# Patient Record
Sex: FEMALE | Race: WHITE | NOT HISPANIC OR LATINO | URBAN - NONMETROPOLITAN AREA
[De-identification: names, ages, dates, MRNs, and addresses within clinical notes are randomized per-mention and may not be internally consistent; named-entity substitution may affect disease eponyms.]

---

## 2017-03-15 ENCOUNTER — OFFICE VISIT (OUTPATIENT)
Dept: URGENT CARE | Facility: PHYSICIAN GROUP | Age: 47
End: 2017-03-15
Payer: MEDICAID

## 2017-03-15 VITALS
WEIGHT: 190 LBS | HEART RATE: 63 BPM | RESPIRATION RATE: 18 BRPM | TEMPERATURE: 98.1 F | OXYGEN SATURATION: 97 % | DIASTOLIC BLOOD PRESSURE: 62 MMHG | SYSTOLIC BLOOD PRESSURE: 122 MMHG

## 2017-03-15 DIAGNOSIS — L71.9 ROSACEA: ICD-10-CM

## 2017-03-15 DIAGNOSIS — L29.9 PRURITUS: ICD-10-CM

## 2017-03-15 PROCEDURE — 99214 OFFICE O/P EST MOD 30 MIN: CPT | Performed by: PHYSICIAN ASSISTANT

## 2017-03-15 RX ORDER — HYDROXYZINE HYDROCHLORIDE 25 MG/1
25 TABLET, FILM COATED ORAL 3 TIMES DAILY PRN
Qty: 30 TAB | Refills: 0 | Status: SHIPPED | OUTPATIENT
Start: 2017-03-15 | End: 2024-02-22

## 2017-03-15 ASSESSMENT — ENCOUNTER SYMPTOMS
FEVER: 0
MYALGIAS: 0
CHILLS: 0
FATIGUE: 0

## 2017-03-15 NOTE — PROGRESS NOTES
Subjective:      Roxann Houston is a 46 y.o. female who presents with Rash            HPI Comments: Patient reports that she's had an erythematous, pruritic rash of her face on and off for the last several months. She was told that it was likely rosacea and was given metronidazole cream. She was told to use the cream once a day when symptoms flared up. She reports improvement with treatment, but not resolution. Over the last week or so she has noticed significant pruritus.    Rash  This is a recurrent problem. The current episode started more than 1 month ago. The problem has been waxing and waning since onset. The affected locations include the face. The rash is characterized by redness and swelling. She was exposed to nothing. Pertinent negatives include no fatigue or fever. Treatments tried: topical antifungal, topical steroids. The treatment provided moderate relief.       Review of Systems   Constitutional: Negative for fever, chills and fatigue.   Musculoskeletal: Negative for myalgias.   Skin: Positive for itching and rash.       Allergies:Aspirin; Latex; Penicillins; and Sulfa drugs    Current Outpatient Prescriptions Ordered in [facility]   Medication Sig Dispense Refill   • metronidazole (NORITATE) 1 % cream Apply 1 Application to affected area(s) 2 times a day. 60 g 0   • hydrOXYzine (ATARAX) 25 MG Tab Take 1 Tab by mouth 3 times a day as needed for Itching. 30 Tab 0   • lisinopril (PRINIVIL) 10 MG Tab Take 10 mg by mouth 2 times a day.     • gabapentin (NEURONTIN) 600 MG tablet Take 600 mg by mouth 3 times a day.     • Cyanocobalamin (VITAMIN B 12 PO) Take  by mouth.     • Multiple Vitamin (VITAMIN E/FOLIC ACID/B-6/B-12 PO) Take  by mouth.     • phentermine (ADIPEX-P) 37.5 MG tablet Take 37.5 mg by mouth every morning before breakfast.     • levothyroxine (SYNTHROID) 100 MCG Tab Take 100 mcg by mouth Every morning on an empty stomach.     • Interferon Beta-1a (AVONEX IM) by Intramuscular route.     • ibuprofen  (MOTRIN) 800 MG Tab Take 800 mg by mouth every 8 hours as needed.     • Omega-3 Fatty Acids (FISH OIL PO) Take  by mouth.       No current Epic-ordered facility-administered medications on file.       History reviewed. No pertinent past medical history.    Social History   Substance Use Topics   • Smoking status: Never Smoker    • Smokeless tobacco: Never Used   • Alcohol Use: Yes      Comment: occ       No family status information on file.   History reviewed. No pertinent family history.     Objective:     /62 mmHg  Pulse 63  Temp(Src) 36.7 °C (98.1 °F)  Resp 18  Wt 86.183 kg (190 lb)  SpO2 97%  Breastfeeding? No     Physical Exam   Constitutional: She is oriented to person, place, and time. She appears well-developed and well-nourished. No distress.   HENT:   Head: Normocephalic and atraumatic.   Slight erythema with a few small raised lesions of the face   Eyes: Right eye exhibits no discharge. Left eye exhibits no discharge.   Neck: Normal range of motion. Neck supple.   Cardiovascular: Normal rate.    Pulmonary/Chest: Effort normal.   Neurological: She is alert and oriented to person, place, and time.   Skin: Skin is warm and dry. She is not diaphoretic.   Psychiatric: She has a normal mood and affect. Her behavior is normal. Judgment and thought content normal.   Nursing note and vitals reviewed.              Assessment/Plan:     1. Rosacea  metronidazole (NORITATE) 1 % cream    Improvement with metronidazole cream in the past. Refill prescription. Follow-up with PCP. Return if worsening.   2. Pruritus  hydrOXYzine (ATARAX) 25 MG Tab    Fluctuating, quite severe at times. Start hydroxyzine. Follow-up with PCP. Return if worsening.       [employer] Interactive Patient Education given: Rosacea    Please note that this dictation was created using voice recognition software. I have made every reasonable attempt to correct obvious errors, but I expect that there are errors of grammar and possibly content  that I did not discover before finalizing the note.

## 2017-03-15 NOTE — PATIENT INSTRUCTIONS
Rosacea  Rosacea is a long-term (chronic) condition that affects the skin of the face (cheeks, nose, brow, and chin) and sometimes the eyes. Rosacea causes the blood vessels near the surface of the skin to enlarge, resulting in redness. This condition usually begins after age 30. It occurs most often in light-skinned women. Without treatment, rosacea tends to get worse over time. There is no cure for rosacea, but treatment can help control your symptoms.  CAUSES   The cause is unknown. It is thought that some people may inherit a tendency to develop rosacea. Certain triggers can make your rosacea worse, including:  · Hot baths.  · Exercise.  · Sunlight.  · Very hot or cold temperatures.  · Hot or spicy foods and drinks.  · Drinking alcohol.  · Stress.  · Taking blood pressure medicine.  · Long-term use of topical steroids on the face.  SYMPTOMS   · Redness of the face.  · Red bumps or pimples on the face.  · Red, enlarged nose (rhinophyma).  · Blushing easily.  · Red lines on the skin.  · Irritated or burning feeling in the eyes.  · Swollen eyelids.  DIAGNOSIS   Your caregiver can usually tell what is wrong by asking about your symptoms and performing a physical exam.  TREATMENT   Avoiding triggers is an important part of treatment. You will also need to see a skin specialist (dermatologist) who can develop a treatment plan for you. The goals of treatment are to control your condition and to improve the appearance of your skin. It may take several weeks or months of treatment before you notice an improvement in your skin. Even after your skin improves, you will likely need to continue treatment to prevent your rosacea from coming back. Treatment methods may include:  · Using sunscreen or sunblock daily to protect the skin.  · Antibiotic medicine, such as metronidazole, applied directly to the skin.  · Antibiotics taken by mouth. This is usually prescribed if you have eye problems from your rosacea.  · Laser surgery  to improve the appearance of the skin. This surgery can reduce the appearance of red lines on the skin and can remove excess tissue from the nose to reduce its size.  HOME CARE INSTRUCTIONS  · Avoid things that seem to trigger your flare-ups.  · If you are given antibiotics, take them as directed. Finish them even if you start to feel better.  · Use a gentle facial cleanser that does not contain alcohol.  · You may use a mild facial moisturizer.  · Use a sunscreen or sunblock with SPF 30 or greater.  · Wear a green-tinted foundation powder to conceal redness, if needed. Choose cosmetics that are noncomedogenic. This means they do not block your pores.  · If your eyelids are affected, apply warm compresses to the eyelids. Do this up to 4 times a day or as directed by your caregiver.  SEEK MEDICAL CARE IF:  · Your skin problems get worse.  · You feel depressed.  · You lose your appetite.  · You have trouble concentrating.  · You have problems with your eyes, such as redness or itching.  MAKE SURE YOU:  · Understand these instructions.  · Will watch your condition.  · Will get help right away if you are not doing well or get worse.     This information is not intended to replace advice given to you by your health care provider. Make sure you discuss any questions you have with your health care provider.     Document Released: 01/25/2006 Document Revised: 01/08/2016 Document Reviewed: 02/24/2016  [employer] Interactive Patient Education ©2016 [employer]

## 2017-03-15 NOTE — MR AVS SNAPSHOT
Roxann Houston   3/15/2017 3:25 PM   Office Visit   MRN: [id number]    Department:  [facility]   Dept Phone:  [phone]    Description:  Female : 1970   Provider:  AIYANA Garcia           Reason for Visit     Rash Pt states on and off since July, redness on face, itching, pain      Allergies as of 3/15/2017     Allergen Noted Reactions    Aspirin 2016       sensitive    Latex 2016       Penicillins 2016       Sulfa Drugs 2016         You were diagnosed with     Rosacea   [695.3.ICD-9-CM]       Pruritus   [329500]         Vital Signs     Blood Pressure Pulse Temperature Respirations Weight Oxygen Saturation    122/62 mmHg 63 36.7 °C (98.1 °F) 18 86.183 kg (190 lb) 97%    Breastfeeding? Smoking Status                No Never Smoker           Basic Information     Date Of Birth Sex Race Ethnicity Preferred Language    1970 Female White Non- English      Health Maintenance        Date Due Completion Dates    IMM DTaP/Tdap/Td Vaccine (1 - Tdap) 1988 ---    PAP SMEAR 1990 ---    MAMMOGRAM 2009 ---    IMM INFLUENZA (1) 2016 ---            Current Immunizations     No immunizations on file.      Below and/or attached are the medications your provider expects you to take. Review all of your home medications and newly ordered medications with your provider and/or pharmacist. Follow medication instructions as directed by your provider and/or pharmacist. Please keep your medication list with you and share with your provider. Update the information when medications are discontinued, doses are changed, or new medications (including over-the-counter products) are added; and carry medication information at all times in the event of emergency situations     Allergies:  ASPIRIN - (reactions not documented)     LATEX - (reactions not documented)     PENICILLINS - (reactions not documented)     SULFA DRUGS - (reactions not documented)                  Medications  Valid as of: March 15, 2017 -  4:12 PM    Generic Name Brand Name Tablet Size Instructions for use    Cyanocobalamin   Take  by mouth.        Gabapentin (Tab) NEURONTIN 600 MG Take 600 mg by mouth 3 times a day.        HydrOXYzine HCl (Tab) ATARAX 25 MG Take 1 Tab by mouth 3 times a day as needed for Itching.        Ibuprofen (Tab) MOTRIN 800 MG Take 800 mg by mouth every 8 hours as needed.        Interferon Beta-1a   by Intramuscular route.        Levothyroxine Sodium (Tab) SYNTHROID 100 MCG Take 100 mcg by mouth Every morning on an empty stomach.        Lisinopril (Tab) PRINIVIL 10 MG Take 10 mg by mouth 2 times a day.        MetroNIDAZOLE (Cream) NORITATE 1 % Apply 1 Application to affected area(s) 2 times a day.        Multiple Vitamin   Take  by mouth.        Omega-3 Fatty Acids   Take  by mouth.        Phentermine HCl (Tab) ADIPEX-P 37.5 MG Take 37.5 mg by mouth every morning before breakfast.        .                 Medicines prescribed today were sent to:     [address] [address]    Phone: [phone] Fax: [phone]    Open 24 Hours?: No      Medication refill instructions:       If your prescription bottle indicates you have medication refills left, it is not necessary to call your provider’s office. Please contact your pharmacy and they will refill your medication.    If your prescription bottle indicates you do not have any refills left, you may request refills at any time through one of the following ways: The online Mi-Pay system (except Urgent Care), by calling your provider’s office, or by asking your pharmacy to contact your provider’s office with a refill request. Medication refills are processed only during regular business hours and may not be available until the next business day. Your provider may request additional information or to have a follow-up visit with you prior to refilling your medication.    *Please Note: Medication refills are assigned a new Rx number when refilled electronically. Your pharmacy may indicate that no refills were authorized even though a new prescription for the same medication is available at the pharmacy. Please request the medicine by name with the pharmacy before contacting your provider for a refill.        Instructions    Rosacea  Rosacea is a long-term (chronic) condition that affects the skin of the face (cheeks, nose, brow, and chin) and sometimes the eyes. Rosacea causes the blood vessels near the surface of the skin to enlarge, resulting in redness. This condition usually begins after age 30. It occurs most often in light-skinned women. Without treatment, rosacea tends to get worse over time. There is no cure for rosacea, but treatment can help control your symptoms.  CAUSES   The cause is unknown. It is thought that some people may inherit a tendency to develop rosacea. Certain triggers can make your rosacea worse, including:  · Hot baths.  · Exercise.  · Sunlight.  · Very hot or cold temperatures.  · Hot or spicy foods and drinks.  · Drinking alcohol.  · Stress.  · Taking blood pressure medicine.  · Long-term use of topical steroids on the face.  SYMPTOMS   · Redness of the face.  · Red bumps or pimples on the face.  · Red, enlarged nose (rhinophyma).  · Blushing easily.  · Red lines on the skin.  · Irritated or burning feeling in the eyes.  · Swollen eyelids.  DIAGNOSIS   Your caregiver can usually tell what is wrong by asking about your symptoms and performing a physical exam.  TREATMENT   Avoiding triggers is an important part of treatment. You will also need to see a skin specialist (dermatologist) who can develop a treatment plan for you. The goals of treatment are to control your condition and to improve the appearance of your skin. It may take several weeks or months of treatment before you notice an improvement in your skin. Even after your skin improves, you will  likely need to continue treatment to prevent your rosacea from coming back. Treatment methods may include:  · Using sunscreen or sunblock daily to protect the skin.  · Antibiotic medicine, such as metronidazole, applied directly to the skin.  · Antibiotics taken by mouth. This is usually prescribed if you have eye problems from your rosacea.  · Laser surgery to improve the appearance of the skin. This surgery can reduce the appearance of red lines on the skin and can remove excess tissue from the nose to reduce its size.  HOME CARE INSTRUCTIONS  · Avoid things that seem to trigger your flare-ups.  · If you are given antibiotics, take them as directed. Finish them even if you start to feel better.  · Use a gentle facial cleanser that does not contain alcohol.  · You may use a mild facial moisturizer.  · Use a sunscreen or sunblock with SPF 30 or greater.  · Wear a green-tinted foundation powder to conceal redness, if needed. Choose cosmetics that are noncomedogenic. This means they do not block your pores.  · If your eyelids are affected, apply warm compresses to the eyelids. Do this up to 4 times a day or as directed by your caregiver.  SEEK MEDICAL CARE IF:  · Your skin problems get worse.  · You feel depressed.  · You lose your appetite.  · You have trouble concentrating.  · You have problems with your eyes, such as redness or itching.  MAKE SURE YOU:  · Understand these instructions.  · Will watch your condition.  · Will get help right away if you are not doing well or get worse.     This information is not intended to replace advice given to you by your health care provider. Make sure you discuss any questions you have with your health care provider.     Document Released: 01/25/2006 Document Revised: 01/08/2016 Document Reviewed: 02/24/2016  [employer] Interactive Patient Education ©2016 [employer].            Soil IQ Access Code: [email or web address]  Expires: 4/14/2017  4:12 PM    [employer] IQ  A secure, online tool  to manage your health information     [employer]’s Badge® is a secure, online tool that connects you to your personalized health information from the privacy of your home -- day or night - making it very easy for you to manage your healthcare. Once the activation process is completed, you can even access your medical information using the Badge tigre, which is available for free in the Apple Tigre store or Google Play store.     Badge provides the following levels of access (as shown below):   My Chart Features   [facility] Primary [facility] Doctor [facility]  Specialists [facility]  Urgent  Care [facility]  Primary Care  Doctor   Email your healthcare team securely and privately 24/7 X X X    Manage appointments: schedule your next appointment; view details of past/upcoming appointments X      Request prescription refills. X      View recent personal medical records, including lab and immunizations X X X X   View health record, including health history, allergies, medications X X X X   Read reports about your outpatient visits, procedures, consult and ER notes X X X X   See your discharge summary, which is a recap of your hospital and/or ER visit that includes your diagnosis, lab results, and care plan. X X       How to register for Badge:  1. Go to  [email or web address].  2. Click on the Sign Up Now box, which takes you to the New Member Sign Up page. You will need to provide the following information:  a. Enter your Badge Access Code exactly as it appears at the top of this page. (You will not need to use this code after you’ve completed the sign-up process. If you do not sign up before the expiration date, you must request a new code.)   b. Enter your date of birth.   c. Enter your home email address.   d. Click Submit, and follow the next screen’s instructions.  3. Create a Badge ID. This will be your Badge login ID and cannot be changed, so think of one that is secure and easy to remember.  4. Create a  AFTER-MOUSE password. You can change your password at any time.  5. Enter your Password Reset Question and Answer. This can be used at a later time if you forget your password.   6. Enter your e-mail address. This allows you to receive e-mail notifications when new information is available in AFTER-MOUSE.  7. Click Sign Up. You can now view your health information.    For assistance activating your AFTER-MOUSE account, call [phone]

## 2024-02-22 ENCOUNTER — OFFICE VISIT (OUTPATIENT)
Dept: URGENT CARE | Facility: PHYSICIAN GROUP | Age: 54
End: 2024-02-22
Payer: MEDICAID

## 2024-02-22 VITALS
DIASTOLIC BLOOD PRESSURE: 70 MMHG | SYSTOLIC BLOOD PRESSURE: 110 MMHG | HEART RATE: 82 BPM | OXYGEN SATURATION: 100 % | WEIGHT: 200 LBS | RESPIRATION RATE: 18 BRPM | TEMPERATURE: 97.3 F

## 2024-02-22 DIAGNOSIS — R06.2 WHEEZING: ICD-10-CM

## 2024-02-22 DIAGNOSIS — R05.1 ACUTE COUGH: ICD-10-CM

## 2024-02-22 PROBLEM — M79.2 NEURALGIA: Status: ACTIVE | Noted: 2022-02-28

## 2024-02-22 PROBLEM — G57.11 LATERAL FEMORAL CUTANEOUS NEUROPATHY, RIGHT: Status: ACTIVE | Noted: 2022-02-28

## 2024-02-22 PROBLEM — N81.4 UTERINE PROLAPSE: Status: ACTIVE | Noted: 2024-02-22

## 2024-02-22 PROBLEM — C56.9: Status: ACTIVE | Noted: 2024-02-22

## 2024-02-22 LAB
FLUAV RNA SPEC QL NAA+PROBE: NEGATIVE
FLUBV RNA SPEC QL NAA+PROBE: NEGATIVE
RSV RNA SPEC QL NAA+PROBE: POSITIVE
SARS-COV-2 RNA RESP QL NAA+PROBE: NEGATIVE

## 2024-02-22 PROCEDURE — 99203 OFFICE O/P NEW LOW 30 MIN: CPT | Performed by: NURSE PRACTITIONER

## 2024-02-22 PROCEDURE — 3078F DIAST BP <80 MM HG: CPT | Performed by: NURSE PRACTITIONER

## 2024-02-22 PROCEDURE — 87637 SARSCOV2&INF A&B&RSV AMP PRB: CPT | Mod: QW | Performed by: NURSE PRACTITIONER

## 2024-02-22 PROCEDURE — 3074F SYST BP LT 130 MM HG: CPT | Performed by: NURSE PRACTITIONER

## 2024-02-22 RX ORDER — BACLOFEN 10 MG/1
10 TABLET ORAL 2 TIMES DAILY
COMMUNITY
Start: 2024-02-03

## 2024-02-22 RX ORDER — BENZONATATE 100 MG/1
100 CAPSULE ORAL 3 TIMES DAILY PRN
Qty: 30 CAPSULE | Refills: 0 | Status: SHIPPED | OUTPATIENT
Start: 2024-02-22 | End: 2024-03-03

## 2024-02-22 RX ORDER — DULOXETIN HYDROCHLORIDE 60 MG/1
60 CAPSULE, DELAYED RELEASE ORAL DAILY
COMMUNITY
Start: 2024-02-15

## 2024-02-22 RX ORDER — ALBUTEROL SULFATE 90 UG/1
2 AEROSOL, METERED RESPIRATORY (INHALATION) EVERY 6 HOURS PRN
Qty: 8.5 G | Refills: 0 | Status: SHIPPED | OUTPATIENT
Start: 2024-02-22

## 2024-02-22 NOTE — PROGRESS NOTES
Subjective:   Roxann Houston is a 53 y.o. female who presents for Cough (Coughing, feeling it in chest. Exposed to rsv by kids. Does have MS. Started cough on Saturday. No other symptoms. Yellow mucous from nose. )    Patient is a 53-year-old female who presents clinic today reporting 5-day history cough with intermittent clear sputum production, mild wheezing, and fatigue.  She has not had any fevers, chills, chest pain, palpitations, sore throat, nausea, vomiting, or diarrhea.  She has been taking over-the-counter Mucinex which has helped some with her symptoms.  She states that overall she feels fine however has a cough with wheezing.  She was exposed to RSV.    Medications, Allergies, and current problem list reviewed today in Epic.     Objective:     /70   Pulse 82   Temp 36.3 °C (97.3 °F) (Temporal)   Resp 18   Wt 90.7 kg (200 lb)   SpO2 100%     Physical Exam  Vitals reviewed.   Constitutional:       General: She is not in acute distress.     Appearance: Normal appearance. She is not ill-appearing or toxic-appearing.   HENT:      Head: Normocephalic.      Right Ear: Tympanic membrane, ear canal and external ear normal.      Left Ear: Tympanic membrane, ear canal and external ear normal.      Nose: Nose normal. No rhinorrhea.      Mouth/Throat:      Mouth: Mucous membranes are moist.      Pharynx: No posterior oropharyngeal erythema.   Eyes:      Extraocular Movements: Extraocular movements intact.      Conjunctiva/sclera: Conjunctivae normal.      Pupils: Pupils are equal, round, and reactive to light.   Cardiovascular:      Rate and Rhythm: Normal rate and regular rhythm.   Pulmonary:      Effort: Pulmonary effort is normal. No respiratory distress.      Breath sounds: No stridor. No wheezing, rhonchi or rales.   Musculoskeletal:         General: Normal range of motion.      Cervical back: Normal range of motion and neck supple.   Skin:     General: Skin is warm and dry.   Neurological:       Mental Status: She is alert and oriented to person, place, and time.   Psychiatric:         Mood and Affect: Mood normal.         Behavior: Behavior normal.         Thought Content: Thought content normal.         Judgment: Judgment normal.       Results for orders placed or performed in visit on 02/22/24   POCT CoV-2, Flu A/B, RSV by PCR   Result Value Ref Range    SARS-CoV-2 by PCR Negative Negative, Invalid    Influenza virus A RNA Negative Negative, Invalid    Influenza virus B, PCR Negative Negative, Invalid    RSV, PCR Positive (A) Negative, Invalid       Assessment/Plan:     Diagnosis and associated orders:     1. Acute cough  POCT CoV-2, Flu A/B, RSV by PCR    albuterol 108 (90 Base) MCG/ACT Aero Soln inhalation aerosol    benzonatate (TESSALON) 100 MG Cap      2. Wheezing  POCT CoV-2, Flu A/B, RSV by PCR    albuterol 108 (90 Base) MCG/ACT Aero Soln inhalation aerosol    benzonatate (TESSALON) 100 MG Cap         Comments/MDM:     Discussed the management of RSV  and expected course is outined. Reviewed the need for frequent  nasal toileting and use of nasal saline to ensure movement of mucus and prevention of respiratory distress and pneumonia.  Recommend having bed side humidification and elevation of HOB.  Increase fluids, recommend adequate rest.  Treat fevers and bodyaches with Tylenol Motrin per  directions.  Please follow-up in clinic if fever persists or  reoccurs, no improvement with cough or you are not feeling adequately hydrated.    Patient was involved with shared decision-making throughout the exam today and verbalizes understanding regards to plan of care, discharge instructions, and follow-up         Differential diagnosis, natural history, supportive care, and indications for immediate follow-up discussed.    Advised the patient to follow-up with the primary care physician for recheck, reevaluation, and consideration of further management.    I personally reviewed prior external  notes and test results pertinent to today's visit as well as additional imaging and testing completed in clinic today.     Please note that this dictation was created using voice recognition software. I have made a reasonable attempt to correct obvious errors, but I expect that there are errors of grammar and possibly content that I did not discover before finalizing the note.

## 2024-05-20 ENCOUNTER — APPOINTMENT (OUTPATIENT)
Dept: GYNECOLOGY | Facility: CLINIC | Age: 54
End: 2024-05-20
Payer: MEDICAID

## 2024-05-20 VITALS
HEART RATE: 66 BPM | BODY MASS INDEX: 33.12 KG/M2 | DIASTOLIC BLOOD PRESSURE: 90 MMHG | HEIGHT: 67 IN | WEIGHT: 211 LBS | SYSTOLIC BLOOD PRESSURE: 133 MMHG

## 2024-05-20 DIAGNOSIS — G35 MULTIPLE SCLEROSIS (HCC): ICD-10-CM

## 2024-05-20 DIAGNOSIS — N81.9 VAGINAL VAULT PROLAPSE: ICD-10-CM

## 2024-05-20 DIAGNOSIS — N95.8 GENITOURINARY SYNDROME OF MENOPAUSE: ICD-10-CM

## 2024-05-20 DIAGNOSIS — N81.11 CYSTOCELE, MIDLINE: ICD-10-CM

## 2024-05-20 DIAGNOSIS — N39.3 STRESS INCONTINENCE: Primary | ICD-10-CM

## 2024-05-20 DIAGNOSIS — N81.6 RECTOCELE: ICD-10-CM

## 2024-05-20 DIAGNOSIS — R39.198 VOIDING DIFFICULTY: ICD-10-CM

## 2024-05-20 LAB
APPEARANCE UR: CLEAR
BILIRUB UR STRIP-MCNC: NEGATIVE MG/DL
COLOR UR AUTO: YELLOW
GLUCOSE UR STRIP.AUTO-MCNC: NEGATIVE MG/DL
KETONES UR STRIP.AUTO-MCNC: NEGATIVE MG/DL
LEUKOCYTE ESTERASE UR QL STRIP.AUTO: NEGATIVE
NITRITE UR QL STRIP.AUTO: NEGATIVE
PH UR STRIP.AUTO: 7 [PH] (ref 5–8)
PROT UR QL STRIP: NEGATIVE MG/DL
RBC UR QL AUTO: NEGATIVE
SP GR UR STRIP.AUTO: 1.02
UROBILINOGEN UR STRIP-MCNC: NORMAL MG/DL

## 2024-05-20 PROCEDURE — 99204 OFFICE O/P NEW MOD 45 MIN: CPT | Mod: 25 | Performed by: STUDENT IN AN ORGANIZED HEALTH CARE EDUCATION/TRAINING PROGRAM

## 2024-05-20 PROCEDURE — 81002 URINALYSIS NONAUTO W/O SCOPE: CPT | Performed by: STUDENT IN AN ORGANIZED HEALTH CARE EDUCATION/TRAINING PROGRAM

## 2024-05-20 PROCEDURE — 3075F SYST BP GE 130 - 139MM HG: CPT | Performed by: STUDENT IN AN ORGANIZED HEALTH CARE EDUCATION/TRAINING PROGRAM

## 2024-05-20 PROCEDURE — 3080F DIAST BP >= 90 MM HG: CPT | Performed by: STUDENT IN AN ORGANIZED HEALTH CARE EDUCATION/TRAINING PROGRAM

## 2024-05-20 RX ORDER — ESTRADIOL 0.1 MG/G
CREAM VAGINAL
Qty: 1 EACH | Refills: 6 | Status: SHIPPED | OUTPATIENT
Start: 2024-05-20

## 2024-05-20 NOTE — PROGRESS NOTES
Urogynecology & Reconstructive Pelvic Surgery - Consultation Visit    Roxann Houston MRN:[id number] :1970    Referred by: Jonathan Villatoro DO    Reason for Visit:   Chief Complaint   Patient presents with    New Patient     Consult          Subjective     History of Presenting Illness:    Roxann Houston is a 53 y.o. P3 with MS who presents for the evaluation and management of prolapse.     She is bothered by a vaginal bulge which is worsened over the years.  She previously underwent a repair at the time of her hysterectomy but this has recurred.  She has history of back spasms, attributed to her multiple sclerosis.    Also has a history of a hemorrhoidectomy (internal banding, [employer]), and is now started to notice rectal bleeding again when she strains    Prior Pelvic surgery:   2015 hysterectomy, prolapse repair - op report note available     Prior treatment:   none    Pelvic floor symptom review:     Bladder:   Voids per day: 4-8 Voids per night: 1-2      Urinary incontinence episodes per day: varies    Urge leakage:  None   Stress leakage: With Cough and With Laugh   Continuous / insensible urine loss: No    Nocturnal enuresis: No    Leakage volume: Drops   Bladder emptying: Incomplete   Voiding symptoms: Hesitancy and Post-Void Dribble   UTI in last 12 months: no   Other urologic history: no      Prolapse:     Prolapse symptoms: Bulge, Exteriorized Tissue, and Pelvic Pressure   Degree of prolapse: To Introitus   Duration of prolapse symptoms: years      Bowel:    Constipation: Yes   Bowel movements per day: daily    Straining to empty bowels: No    Splinting to evacuate: Yes   Painful evacuation: Yes   Difficulty emptying rectum: Yes   Incontinence to stool: no  H/o hemorrhoidectomy (internal banding, c/b transfucion)  New        Sexual function:    Sexually active: Yes   Gender of partners: Male   Pain with intercourse: No      Past medical and surgical history    Past obstetric history    Past medical  "history:  Past Medical History:   Diagnosis Date    Acute transverse myelitis (HCC)     Hypertension     Hypothyroidism     Insomnia     MS (multiple sclerosis) (HCC)     Teratoma of right ovary      Past surgical history:  Past Surgical History:   Procedure Laterality Date    HEMORRHOIDECTOMY  08/30/2021    ABDOMINAL HYSTERECTOMY TOTAL  03/05/2015    ABDOMINAL EXPLORATION  12/23/2014    EXPLORATORY LAPAROTOMY  2014    TUBAL COAGULATION LAPAROSCOPIC BILATERAL  01/01/2000     Medications:has a current medication list which includes the following prescription(s): estradiol, duloxetine, baclofen, albuterol, metronidazole, lisinopril, gabapentin, multiple vitamin, levothyroxine, and ibuprofen.  Allergies:Food, Nickel, Aspirin, Latex, Penicillins, and Sulfa drugs  Family history:History reviewed. No pertinent family history.  Social history: reports that she has never smoked. She has never used smokeless tobacco. She reports current alcohol use. She reports that she does not use drugs.    Review of systems: A full review of systems was performed, and negative with the exception of want is noted above in the HPI.        Objective        BP (!) 133/90 (BP Location: Right arm, Patient Position: Sitting, BP Cuff Size: Large adult)   Pulse 66   Ht 5' 7\"   Wt 211 lb   BMI 33.05 kg/m²     Physical Exam  Vitals reviewed. Exam conducted with a chaperone present (MA - see notes.).   Constitutional:       Appearance: Normal appearance.   HENT:      Head: Normocephalic.      Mouth/Throat:      Mouth: Mucous membranes are moist.   Cardiovascular:      Rate and Rhythm: Normal rate.   Pulmonary:      Effort: Pulmonary effort is normal.   Abdominal:      Palpations: Abdomen is soft. There is no mass.      Tenderness: There is no abdominal tenderness.   Skin:     General: Skin is warm and dry.   Neurological:      Mental Status: She is alert.   Psychiatric:         Mood and Affect: Mood normal.         Genitourinary:    Vulva: " WNL   Bulbocavernosus reflex: Intact   Anal wink reflex: Intact   Perineal sensation: Decreased   Urethra: Atrophic   Vagina: Atrophic   Atrophy: Mild   Cough stress test: Negative    Pelvic floor:    POP-Q: Aa -1.5 / Ba -1.5 / TVL 7 / C -4 / D x / Ap +1 / Bp +1 / GH 5 / PB 2   Large posterior enterocele   Prolapse stage: 2   Bladder/ suprapubic tenderness: No    Levator tenderness: None   Levator muscle tone: Hypertonic   Pelvic floor contraction strength (modified Oxford scale): 2=Weak   Pelvic floor contraction duration: Brief    Bimanual exam: No Palpable Adnexal Masses    Procedure Performed: No    Diagnostic test and records review:      Post-void residual: 6 mL, performed by Bladder Scanner    Labs: n/a    Radiology: n/a    Procedural: n/a    Documentation reviewed: Prior EMR Records         Assessment & Plan     Roxann Houston is a 53 y.o. P3 with multiple sclerosis, recurrent symptomatic prolapse and stress predominant incontinence. We discussed my recommendations for further diagnosis and treatment at length today.     1. Vaginal vault prolapse  2. Cystocele, midline  3. Rectocele  She has symptomatic pelvic organ prolapse, posterior/vault predominant.  In most cases, this is not a dangerous condition that necessitates treatment in all patients, but treatment is offered when it impacts quality of life, bladder function, or bowel function.  I reviewed the clinical findings and discussed the pathogenesis extensively, including genetic tendency, aging, menopause and childbirth injuries. Also discussed options for management, including both nonsurgical and surgical options.   Nonsurgical options include both expectant management, pelvic floor physical therapy to prevent progression, and pessary use. I discussed different types of pessary as well as pessary care.  She is interested in definitive management  We reviewed all surgical options including both vaginal and laparoscopic reconstructive approaches, and  those using native tissue (nonmesh) and mesh augmented approaches.  We discussed recurrent/retreatment risk for all surgical approaches.  Native tissue (nonmesh) approaches have a retreatment rate in excess of 20% long-term, and this is reduced with mesh augmentation (approximately 5-8%).  Recurrent forms of surgical mesh have been extensively evaluated for their safety, but there is a 1 to 5% risk long-term of mesh complications, the majority of which include mesh exposure, which was discussed with her.  She strongly prefers the most definitive intervention especially given her prior recurrence and opts for a mesh augmented approach.  She opts for scheduling: Robotic sacrocolpopexy, posterior repair/perineorrhaphy, possible mid urethral sling, and all indicated procedures.  In addition to my verbal counseling today, detailed written counseling was provided that detailed the surgical procedures, preoperative information, risks of surgery, postoperative recovery arc.  She was instructed to review these in detail prior to her preoperative visit and to bring questions.        4. Stress incontinence  5. Voiding difficulty  6. Multiple sclerosis (HCC)  She already has symptomatic stress incontinence.  I recommended likely treatment of stress incontinence at the time of prolapse repair via sling or bulking, however I would prefer to test her bladder with urodynamics first due to her subjective voiding dysfunction and history of multiple sclerosis.  Her emptying function may preclude a concomitant stress incontinence procedure.  She understands this portion of her workup.    7. Genitourinary syndrome of menopause  She has vaginal atrophy / genitorurinary syndrome of menopause. This is very common and due to low estrogen levels, which render the vaginal tissue thin, irritated, and open to colonization with gut ayden. This can lead to irritation, dryness, painful sex, urinary infections and urinary urgency and incontinence.  Discussed risks, benefits, and indications for vaginal estrogen therapy.  Vaginal estrogen is considered a topical-only therapy that has negligible absorption into the bloodstream and is not associated with increased risks for uterine or breast cancers, stroke, blood clots. The effects of vaginal estrogen can take weeks to months.  Prescription given for:   - estradiol (ESTRACE VAGINAL) 0.1 MG/GM vaginal cream; Apply 1g cream inside vagina twice per week  Dispense: 1 Each; Refill: 6               Satnam Perera MD, FACOG  Urogynecology and Reconstructive Pelvic Surgery  Department of Obstetrics and Gynecology  [facility] [[email or web address] of [facility]        This medical record contains text that has been entered with the assistance of computer voice recognition and dictation software.  Therefore, it may contain unintended errors in text, spelling, punctuation, or grammar

## 2024-05-20 NOTE — PATIENT INSTRUCTIONS
Pre-op: What you should know before your surgery  Laparoscopic/robotic reconstructive surgery for prolapse  (mesh-augmented)      What you should know before your surgery  You are scheduled for surgery to fix your prolapse (vaginal bulge) using sutures and lightweight mesh to suspend pelvic structures back into a supported position. These surgeries are minimally-invasive, using only small “keyhole” cuts on the abdomen and in the vagina. The documents in this packet will outline each part of the surgery. There may be a few “possible” surgeries listed. We use the word “possible” because we tailor the surgery based on your needs. This means we won't know how much surgery you'll need until after you receive anesthesia and fall asleep.     When you fall asleep, the pelvic muscles will relax, allowing us to determine how much surgery you need. In general, we will do as few procedures as possible to fix your prolapse but address each area as needed.     Goals of prolapse surgery: The primary goal of surgery is to repair the prolapse and eliminate the symptoms of a vaginal bulge and pressure. Depending on your symptoms, the surgery may possibly improve bladder emptying and/or rectal emptying, as well as urinary incontinence.      Prolapse recurrence:  Your procedure will utilize a permanent implanted mesh material to enhance the support of your pelvic structures, and reduce the likelihood of your prolapse returning. This will be described in more detail below in the details of your surgery. However, even with this mesh, there is still a long-term risk of prolapse returning (30% of women), and needing an additional surgery (<10% of women).     Sexual function:  Following surgical repair, most patients experience improvements in their sexual function. Surgery tends to improve the general discomfort of sex associated with prolapse. However, if you have a long history of pain with sex (either externally or internally), this  is unlikely to be due to prolapse. Therefore, surgery may not improve these symptoms.     Surgery involves the cutting and re-sewing of the vaginal tissues. Scar tissue may form after surgery, which can lead to painful sex for some patients. In many patients, this new pain goes away over time or can be improved with pelvic physical therapy, lubrication, dilators, or vaginal estrogen.     Bladder urgency and incontinence after surgery:  Bladder tests may be performed before your surgery to see whether you are at risk for new or worsening urinary leakage after prolapse repair. Our bladder testing is a great tool to find out who is at risk for urinary leakage, but it is not perfect. There is a chance you may have new or increased leakage with coughing, sneezing, or laughing after surgery. Problems with leakage can be addressed in a number of ways. Bladder urgency and/or frequency often improves after surgery, but it may worsen in some patients. We have various medications and therapies that can help with this urgency after surgery, if necessary.    Risk of postoperative pain:   Pain after prolapse surgery is a normal part of the healing process, but usually well-tolerated. Common areas of pain include the pelvis, buttocks, hips and back, often related to positioning.   Try changing your position when sitting or resting in bed to relieve the pain    Expect to have some pain when you are discharged home. This should improve with time and with use of medications. See “after surgery” instructions for more details on pain control.      General risks of pelvic reconstructive surgery: Specific risks for your procedure are discussed separately  Anesthesia: With modern anesthetics and monitoring equipment, complications due to anesthesia are very rare. Your anesthesiologist will discuss what will be most suitable for you on the day of surgery  Bleeding: All surgery results in bleeding, however this surgery usually amounts to  blood loss between a tablespoon and a teacup. Large amounts of blood loss that requires a blood transfusion are not common (only 1-2% of women) in prolapse surgery.  Blood transfusion, if needed, is safe. Minor reactions like fever or allergy occur in less than 1% of transfusions. Riskan infection or mismatched blood is very rare (less than 1 out of every 100,000 transfusions).   Infection: The most common infection with prolapse surgery is a urinary tract infection (UTI). This is easily treated. Wound infections occur in 2-3% of patients. Rarely, infection of the abdominal/vaginal wounds may occur, or abscesses in the pelvis may develop. These infections may need to be treated with antibiotics or another procedure to fix them. Risk factors for infections and wound complications include diabetes, smoking, obesity, and other chronic illnesses.  Blood Clots: Clots in the blood vessels of the legs and lungs are potentially dangerous and can occur in patients undergoing prolapse surgery. This is prevented with leg compression during surgery, and sometimes, an injected blood thinner. We strongly encourage you to stay mobile because this is an important way to prevent clots.  Damage to surrounding organs. The pelvic organs are all very close together. The risk of damage to other organs increases if you have had prior pelvic surgeries, as well as a history of endometriosis or pelvic infection. These conditions can cause scar tissue to develop in the pelvis. Scar tissue can cause organs to stick together, making the surgery more difficult.    Ureter and bladder damage: The ureters are tubes that carry urine from the kidneys to the bladder. They travel very close to the uterus and vagina. The bladder is attached to both your uterus and the front of the vagina. Damage/injury can happen during prolapse-repair procedures. A cystoscopy (camera in the bladder) will be done during your surgery to ensure there is no bladder or  ureter damage/injury. If injury occurs, it may require temporary placement of a stent (drainage tube). In rare cases, a larger abdominal incision may be needed to repair the injury. A bladder catheter may need to stay in place temporarily after surgery.  Bowel damage: Bowel damage/injury is uncommon during your surgery. Any damage that is seen in surgery will be fixed. Very rarely, a temporary ostomy (connection of bowel to the front of the abdomen and collection of stool with a bag) is required for a higher-risk bowel injury.  Vascular damage: Major damage to blood vessels is uncommon. If this occurs, it may require a larger surgery and/or blood transfusion.  Fistula: A fistula is an abnormal connection between the vagina and either the bladder or rectum. A fistula leads to leakage of urine or stool. These are rare complications that arise during healing from pelvic surgery that sometimes require additional surgeries to correct. We take great care is during your surgery to prevent these fistulas. If they do occur, your Urogynecologist is the leading expert in fixing them.        Main Surgical Procedure:    Laparoscopic/robotic sacrocolpopexy   (suspension of the vagina to backbone using mesh)     Once you are asleep, small “keyhole” incisions (smaller than ½ inch) will be made in the abdomen in order to place our instruments. Your surgeon may use a robotic system to help complete the surgery through the small incisions. Then, a lightweight permanent mesh will be sewn onto the front and back surfaces of the vagina. This will lift up the prolapse and attach to a strong band of tissue on your sacrum (lower back bone). We will then look into your bladder with a camera to make sure that no damage was done, and that your ureters (the tubes that carry urine from the kidneys to the bladder) are working.  Sometimes we are unable to perform the mesh suspension safely, and may need to adjust to a different approach using  "other structures around the vagina.  The mesh we use is a lightweight permanent material. This material is stronger than your own tissues, which means there is less chance of the prolapse coming back again. Please note that the abdominal mesh for this procedure is not included in the recent controversies involving vaginal meshes.     Everybody's body responds to permanent material differently. Approximately 2-5% of women who undergo this surgery may note pain with sex, or notice the mesh exposed through the vagina in the future.  This is often easily treated with medication or a small procedure. In rarer instances the mesh can erode into surrounding structures, or lead to pain or recurrent infections. Sometimes the mesh may have to be removed, which would entail additional surgery.      After this procedure is complete, you will be re-examined and then proceed with the following possible procedures:     Main surgical procedure:  Posterior repair, perineorrhaphy  (fix prolapse of the back of the vagina/rectum and pelvic muscles)        The entire procedure will be completed in a minimally invasive manner, with all incisions inside of the vagina. Once you are asleep, a thorough exam will be performed to confirm the areas needing repair. A cut is made along the back wall of the vagina where the rectum is pushing down, and the skin above the rectum is  from the underlying supportive tissue. This stronger tissue underneath is then repaired using sutures that will dissolve over time. Sometimes excess skin is removed. The skin is then closed.     If the area between the vagina and the anus (called the perineum) is weak, then sutures will be placed to make that area stronger. This is called a \"perineorrhaphy.\" This will be just like a repair of an episiotomy or a vaginal tear after having a baby.     The risk of these procedures is similar to those mentioned in the “pre-op” leaflet. However, any surgery to the back " wall of the vagina carries a higher risk of pain with sexual intercourse after surgery (up to 10%) due to scar formation. Great care is taken not to narrow the vagina more than necessary. The perineorrhaphy (or episiotomy type of repair) can be uncomfortable and may be difficult to sit normally for up to 6 weeks after surgery. You may use a doughnut cushion to sit on if needed.          Main Surgical Procedure:    Midurethral sling    (mesh sling under urethra to stop leakage)    The mid-urethral sling procedure will treat the problem of leakage of urine when you experience an increase in abdominal pressure, such as with coughing, sneezing, exercising, or laughing (stress incontinence). This procedure may also be performed if there is a high likelihood that you will develop new stress incontinence after a prolapse repair.    After you go to sleep, the lightweight mesh sling is placed through a small cut made in the vagina over the mid-point of the urethra. Through this cut, the two ends of the sling are passed with a special needle from the vagina, along either side of the urethra, exiting through two very small cuts made just above the pubic bone in the hairline (retropubic sling). Sometimes, the sling is passed through the groin area (trans-obturator sling). The surgeon will then use a camera (cystoscope) to check that the sling is correctly positioned and not sitting within the bladder. The sling is then adjusted so that it sits loosely underneath the urethra. The vaginal skin is then stitched to cover the sling. The ends of the sling are cut off below the skin and the incision closed with skin glue or bandages.    This urinary incontinence mesh is not the same kind of vaginal mesh you may have heard about on television, which was removed from the market.  Your surgeon will speak to you in detail about this safer sling mesh at your visit.      Risks of this particular procedure include:  Difficulty emptying your  bladder requiring a catheter is common immediately after surgery (20-50%). This usually resolves within 1 week. Most patients who can't urinate immediately after surgery will return to the office in 3-5 days to test the bladder again and remove the catheter. Longer-term difficulty passing urine may occur in 1-5% of patients. During this time your doctor may recommend a fine tube or catheter be used to drain the bladder. If your urine stream remains very slow or you cannot empty the bladder well even after the swelling has settled, your provider will discuss other possibilities, such as cutting or loosening the sling.  Sling exposure. Occasionally the sling can appear in the wall of the vagina a few weeks, months, or years after an operation. Symptoms may include vaginal bleeding, vaginal discharge, or pain with intercourse for the patient or her partner. In such cases, you should consult your surgeon for further advice. Management would involve either vaginal estrogen cream to help the skin grow over the sling, or cutting out the small part of the mesh coming through.  Rarely does the entire vaginal portion of the mesh need to be removed.  The arms of the mesh in the muscles cannot always be removed.  The risk of the mesh coming out into the vagina is 2-4%.  Bladder or urethral perforation. Bladder perforation occurs in 1-5% of procedures. Your surgeon will check for damage during the operation by looking inside the bladder and urethra using a cystoscope, a special camera placed into the bladder. Removing and correctly locating the needle to which the sling is attached should resolve the situation. Bladder perforation, as long as it is recognized, does not affect the success of the operation. Damage to the urethra is more difficult to deal with and should be discussed with your surgeon. This is uncommon, and the sling may not be placed if this occurs.  Pain. Long-term pain following sling surgery is unusual. Studies  suggest that after the operation about 1% will develop vaginal or groin pain. In most cases pain is short lived and does not occur for more than 1 to 2 weeks. In the rare instance that the pain does not go away with physical therapy or vaginal estrogen,  the sling may need to be removed.      Post-op: What to expect after your surgery        Recovery room  You can expect to stay in the recovery room for a few hours until you are alert. There may be a gauze packing in your vagina, which will be removed before you go home. Pain is normal after surgery but should be tolerable. Your pain will become less over the first 2 weeks. If your pain is difficult to control or you need more nursing care, you will stay in the hospital overnight. Most patients do very well going home on the day of surgery.     Bladder function  You will wake up with a small tube (catheter) in your bladder. A bladder test, called a “void trial”, will be performed by the nurse before you go home. The test is done to make sure you can empty your bladder normally. To do the bladder test, the nurse will fill your bladder with sterile water, remove the catheter, and give you 30 minutes to try and urinate (pee) on your own. If you cannot urinate, or if you only urinate a small amount, you will need to:   Go home with a catheter that stays in your bladder OR  Learn to put a catheter into your bladder a few times a day to empty it    Use of a catheter is temporary and usually needed for 2-4 days. It is very common for the bladder to work slowly, or sluggishly, after this type of surgery. One in every 3-4 patients will require some type of catheterization. An antibiotic may be prescribed while the catheter is in place to decrease the risk of infection.    Call the surgeon for treatment, if you have signs and symptoms of a urinary tract infection, including:  Burning with urination  Bladder pain  Worsening need to urinate right away,  Urine with a bad  smell    Vaginal care  Do not go swimming, take sitting baths, and have sexual intercourse for 6 weeks after surgery Do not place anything in your vagina except vaginal estrogen cream, if instructed to do so by your surgeon.   Vaginal discharge and bleeding/spotting is also normal through the entire 6-week recovery. Sometimes small sutures will fall out of the vagina as they dissolve. This is normal. Contact the office with any heavy bleeding, foul discharge or worsening pain.. Contact us with any heavy bleeding, foul discharge or worsening pain.     Pain management  Surgical pain is controlled in most patients with only acetaminophen (Tylenol) and non-steroidal anti-inflammatory drugs (NSAIDs), such as ibuprofen (Advil). These drugs can be taken together because they do not interact with each other. Check your prescription for specific dosing instructions. A cold compress can help with pain in the vaginal area.  Sometimes, a short course of narcotics, such as oxycodone or hydromorphone is required. We do not recommend using narcotics regularly as it can lead to constipation or dizziness and falls. Do not drive or operate heavy machinery while using narcotics. You are unlikely to become addicted if you need to take a narcotic medication a few times within the first week of your surgery.  After the first few days to one week, your pain should decrease and you should not have pain severe enough to need narcotics. If you continue having severe pain, contact your surgeon for re-evaluation.     Abdominal wound care  The incisions on your abdomen will be closed with either small bandages or a surgical glue. There are also tiny dissolving sutures beneath the skin. You can shower with these in place. In shower, let the soapy warm water run over you incisions. Do not scrub or wipe you incisions. The bandages will fall off on their own, or you can remove them after at least 3 days if they become discolored or dirty. The glue  will also fall off on its own after a few weeks. Small sutures that pop out through the skin are normal and will dissolve over time. Contact us if you feel increasing pain or warmth at the incision. Also, call if you see increased redness or discharge (pus) at the incision.      Bowel function  Constipation is common after surgery, and it sometimes take several days before having a normal bowel movement. It is important to use a bowel regimen to keep your stools soft and avoid straining with bowel movements, which may damage the prolapse repair before it has healed. Most patients will be given a prescription for a stool softener (docusate) as well as a gentle laxative (Miralax or lactulose), which adds water to the stool to make it easier to pass. Take these daily throughout your recovery, and hold for a day if you develop diarrhea. Please call us if you experience any repeated episodes of vomiting, worsening abdominal pain/bloating, or are unable to have a bowel movement for more than 3 days.     Activity restrictions  During the first 6 weeks you should avoid any type of heavy lifting.  Gentle walking is good exercise. Start with about 10 minutes a day when you feel ready and build up gradually. Avoid repetitive squatting or bending at the waist. Avoid any fitness-type training, aerobics, etc. for at least 6 weeks after surgery. Listen to your body during the recovery period, and increase activity when you feel comfortable. Generally, you will need 4-6 weeks off work. This period may be longer if you have a very physical job.    Return to sex  When your surgeon clears you to resume sex (if desired), you should start out slowly and to use adequate lubrication. Your vagina and pelvis have been re-structured, and it can take time to get used to sex after surgery. Most scar tissue softens over time and discomfort improves. If discomfort does not go away, contact us to discuss to schedule an exam and to discuss further  options. In some cases, your surgeon may prescribe a low dose of vaginal estrogen to help with vaginal dryness and pain that may happen with sex.

## 2024-05-20 NOTE — PROGRESS NOTES
PT here today for consult   Ref for prolapse   Hysterectomy? 2015   Good #: [phone]   PVR : 6 mL  Pharmacy Verified

## 2024-06-18 ENCOUNTER — HOSPITAL ENCOUNTER (OUTPATIENT)
Facility: MEDICAL CENTER | Age: 54
End: 2024-06-18
Attending: STUDENT IN AN ORGANIZED HEALTH CARE EDUCATION/TRAINING PROGRAM | Admitting: STUDENT IN AN ORGANIZED HEALTH CARE EDUCATION/TRAINING PROGRAM
Payer: MEDICAID

## 2024-07-16 ENCOUNTER — APPOINTMENT (OUTPATIENT)
Dept: ADMISSIONS | Facility: MEDICAL CENTER | Age: 54
End: 2024-07-16
Attending: STUDENT IN AN ORGANIZED HEALTH CARE EDUCATION/TRAINING PROGRAM
Payer: MEDICAID

## 2024-07-19 ENCOUNTER — PRE-ADMISSION TESTING (OUTPATIENT)
Dept: ADMISSIONS | Facility: MEDICAL CENTER | Age: 54
End: 2024-07-19
Attending: STUDENT IN AN ORGANIZED HEALTH CARE EDUCATION/TRAINING PROGRAM
Payer: MEDICAID

## 2024-07-19 RX ORDER — LEVOTHYROXINE SODIUM 75 UG/1
75 TABLET ORAL
COMMUNITY
Start: 2024-05-14

## 2024-07-19 RX ORDER — FLUTICASONE PROPIONATE 50 MCG
2 SPRAY, SUSPENSION (ML) NASAL DAILY
COMMUNITY

## 2024-07-19 RX ORDER — LORATADINE 10 MG/1
10 TABLET ORAL DAILY
COMMUNITY

## 2024-07-19 RX ORDER — LISINOPRIL 30 MG/1
30 TABLET ORAL DAILY
COMMUNITY
Start: 2024-07-02

## 2024-07-25 ENCOUNTER — PROCEDURE VISIT (OUTPATIENT)
Dept: GYNECOLOGY | Facility: CLINIC | Age: 54
End: 2024-07-25
Payer: MEDICAID

## 2024-07-25 ENCOUNTER — PRE-ADMISSION TESTING (OUTPATIENT)
Dept: ADMISSIONS | Facility: MEDICAL CENTER | Age: 54
End: 2024-07-25
Attending: STUDENT IN AN ORGANIZED HEALTH CARE EDUCATION/TRAINING PROGRAM
Payer: MEDICAID

## 2024-07-25 VITALS — DIASTOLIC BLOOD PRESSURE: 83 MMHG | HEART RATE: 79 BPM | SYSTOLIC BLOOD PRESSURE: 121 MMHG

## 2024-07-25 DIAGNOSIS — N39.3 STRESS INCONTINENCE: ICD-10-CM

## 2024-07-25 DIAGNOSIS — Z01.812 PRE-OPERATIVE LABORATORY EXAMINATION: ICD-10-CM

## 2024-07-25 LAB
ANION GAP SERPL CALC-SCNC: 14 MMOL/L (ref 7–16)
APPEARANCE UR: CLEAR
BASOPHILS # BLD AUTO: 0.6 % (ref 0–1.8)
BASOPHILS # BLD: 0.04 K/UL (ref 0–0.12)
BILIRUB UR STRIP-MCNC: NEGATIVE MG/DL
BUN SERPL-MCNC: 15 MG/DL (ref 8–22)
CALCIUM SERPL-MCNC: 9.3 MG/DL (ref 8.5–10.5)
CHLORIDE SERPL-SCNC: 103 MMOL/L (ref 96–112)
CO2 SERPL-SCNC: 23 MMOL/L (ref 20–33)
COLOR UR AUTO: YELLOW
CREAT SERPL-MCNC: 0.82 MG/DL (ref 0.5–1.4)
EOSINOPHIL # BLD AUTO: 0.17 K/UL (ref 0–0.51)
EOSINOPHIL NFR BLD: 2.7 % (ref 0–6.9)
ERYTHROCYTE [DISTWIDTH] IN BLOOD BY AUTOMATED COUNT: 43 FL (ref 35.9–50)
GFR SERPLBLD CREATININE-BSD FMLA CKD-EPI: 84 ML/MIN/1.73 M 2
GLUCOSE SERPL-MCNC: 144 MG/DL (ref 65–99)
GLUCOSE UR STRIP.AUTO-MCNC: NEGATIVE MG/DL
HCT VFR BLD AUTO: 39.9 % (ref 37–47)
HGB BLD-MCNC: 13.5 G/DL (ref 12–16)
IMM GRANULOCYTES # BLD AUTO: 0.03 K/UL (ref 0–0.11)
IMM GRANULOCYTES NFR BLD AUTO: 0.5 % (ref 0–0.9)
KETONES UR STRIP.AUTO-MCNC: NEGATIVE MG/DL
LEUKOCYTE ESTERASE UR QL STRIP.AUTO: NEGATIVE
LYMPHOCYTES # BLD AUTO: 1.92 K/UL (ref 1–4.8)
LYMPHOCYTES NFR BLD: 30.1 % (ref 22–41)
MCH RBC QN AUTO: 30.2 PG (ref 27–33)
MCHC RBC AUTO-ENTMCNC: 33.8 G/DL (ref 32.2–35.5)
MCV RBC AUTO: 89.3 FL (ref 81.4–97.8)
MONOCYTES # BLD AUTO: 0.33 K/UL (ref 0–0.85)
MONOCYTES NFR BLD AUTO: 5.2 % (ref 0–13.4)
NEUTROPHILS # BLD AUTO: 3.89 K/UL (ref 1.82–7.42)
NEUTROPHILS NFR BLD: 60.9 % (ref 44–72)
NITRITE UR QL STRIP.AUTO: NEGATIVE
NRBC # BLD AUTO: 0 K/UL
NRBC BLD-RTO: 0 /100 WBC (ref 0–0.2)
PH UR STRIP.AUTO: 5.5 [PH] (ref 5–8)
PLATELET # BLD AUTO: 234 K/UL (ref 164–446)
PMV BLD AUTO: 9.5 FL (ref 9–12.9)
POTASSIUM SERPL-SCNC: 4 MMOL/L (ref 3.6–5.5)
PROT UR QL STRIP: NEGATIVE MG/DL
RBC # BLD AUTO: 4.47 M/UL (ref 4.2–5.4)
RBC UR QL AUTO: NEGATIVE
SODIUM SERPL-SCNC: 140 MMOL/L (ref 135–145)
SP GR UR STRIP.AUTO: 1.01
UROBILINOGEN UR STRIP-MCNC: 0.2 MG/DL
WBC # BLD AUTO: 6.4 K/UL (ref 4.8–10.8)

## 2024-07-25 PROCEDURE — 85025 COMPLETE CBC W/AUTO DIFF WBC: CPT

## 2024-07-25 PROCEDURE — 51741 ELECTRO-UROFLOWMETRY FIRST: CPT | Performed by: STUDENT IN AN ORGANIZED HEALTH CARE EDUCATION/TRAINING PROGRAM

## 2024-07-25 PROCEDURE — 51784 ANAL/URINARY MUSCLE STUDY: CPT | Performed by: STUDENT IN AN ORGANIZED HEALTH CARE EDUCATION/TRAINING PROGRAM

## 2024-07-25 PROCEDURE — 36415 COLL VENOUS BLD VENIPUNCTURE: CPT

## 2024-07-25 PROCEDURE — 51729 CYSTOMETROGRAM W/VP&UP: CPT | Performed by: STUDENT IN AN ORGANIZED HEALTH CARE EDUCATION/TRAINING PROGRAM

## 2024-07-25 PROCEDURE — 51797 INTRAABDOMINAL PRESSURE TEST: CPT | Performed by: STUDENT IN AN ORGANIZED HEALTH CARE EDUCATION/TRAINING PROGRAM

## 2024-07-25 PROCEDURE — 81002 URINALYSIS NONAUTO W/O SCOPE: CPT | Performed by: NURSE PRACTITIONER

## 2024-07-25 PROCEDURE — 80048 BASIC METABOLIC PNL TOTAL CA: CPT

## 2024-07-25 PROCEDURE — 99214 OFFICE O/P EST MOD 30 MIN: CPT | Mod: 25 | Performed by: STUDENT IN AN ORGANIZED HEALTH CARE EDUCATION/TRAINING PROGRAM

## 2024-09-23 ENCOUNTER — APPOINTMENT (OUTPATIENT)
Dept: ADMISSIONS | Facility: MEDICAL CENTER | Age: 54
End: 2024-09-23
Attending: STUDENT IN AN ORGANIZED HEALTH CARE EDUCATION/TRAINING PROGRAM
Payer: MEDICAID

## 2024-09-26 ENCOUNTER — APPOINTMENT (OUTPATIENT)
Dept: GYNECOLOGY | Facility: CLINIC | Age: 54
End: 2024-09-26
Payer: MEDICAID

## 2024-09-26 DIAGNOSIS — N81.9 VAGINAL VAULT PROLAPSE: ICD-10-CM

## 2024-09-26 DIAGNOSIS — N95.8 GENITOURINARY SYNDROME OF MENOPAUSE: ICD-10-CM

## 2024-09-26 DIAGNOSIS — R39.198 VOIDING DIFFICULTY: ICD-10-CM

## 2024-09-26 DIAGNOSIS — N39.3 STRESS INCONTINENCE: ICD-10-CM

## 2024-09-26 DIAGNOSIS — N81.11 CYSTOCELE, MIDLINE: ICD-10-CM

## 2024-09-26 DIAGNOSIS — G35 MULTIPLE SCLEROSIS (HCC): ICD-10-CM

## 2024-09-26 DIAGNOSIS — N81.6 RECTOCELE: ICD-10-CM

## 2024-09-26 NOTE — PROGRESS NOTES
Urogynecology & Reconstructive Pelvic Surgery - Follow Up    Roxann Houston MRN:[id number] :1970    Referred by: Jonathan Villatoro DO    Reason for Visit:   Chief Complaint   Patient presents with    Other     Virtual Pre Op      This evaluation was conducted via Zoom using secure and encrypted videoconferencing technology. The patient was in a private location at her home in the [facility].    The patient's identity was confirmed and verbal consent was obtained for this virtual visit.      Subjective     History of Presenting Illness:    Roxann Houston is a 53 y.o. P3 with MS with prolapse and incontinence who presents for follow-up    Her prior surgical date was cancelled and presents again for pre op.     She underwent urodynamic testing earlier today -this did not demonstrate stress incontinence but she has subjective stress leak which she would like addressed at the time of surgery.  She has reviewed all preoperative materials and brings questions, particularly in regards to what to do with her kesimpta sadiq-op, which she hasn't started yet.       Initial HPI: She presents for the evaluation and management of prolapse.     She is bothered by a vaginal bulge which is worsened over the years.  She previously underwent a repair at the time of her hysterectomy but this has recurred.  She has history of back spasms, attributed to her multiple sclerosis.    Also has a history of a hemorrhoidectomy (internal banding, PowerPort), and is now started to notice rectal bleeding again when she strains    Prior Pelvic surgery:   2015 hysterectomy, prolapse repair - op report note available     Prior treatment:   none    Pelvic floor symptom review:     Bladder:   Voids per day: 4-8 Voids per night: 1-2      Urinary incontinence episodes per day: varies    Urge leakage:  None   Stress leakage: With Cough and With Laugh   Continuous / insensible urine loss: No    Nocturnal enuresis: No    Leakage volume: Drops   Bladder  emptying: Incomplete   Voiding symptoms: Hesitancy and Post-Void Dribble   UTI in last 12 months: no   Other urologic history: no      Prolapse:     Prolapse symptoms: Bulge, Exteriorized Tissue, and Pelvic Pressure   Degree of prolapse: To Introitus   Duration of prolapse symptoms: years      Bowel:    Constipation: Yes   Bowel movements per day: daily    Straining to empty bowels: No    Splinting to evacuate: Yes   Painful evacuation: Yes   Difficulty emptying rectum: Yes   Incontinence to stool: no  H/o hemorrhoidectomy (internal banding, c/b transfucion)  New        Sexual function:    Sexually active: Yes   Gender of partners: Male   Pain with intercourse: No      Past medical and surgical history    Past obstetric history    Past medical history:  Past Medical History:   Diagnosis Date    Hypertension 07/19/2024    states controlled on medication    Urinary incontinence 07/19/2024    stress incontinence    Dental disorder 07/19/2024    dental extraction scheduled for 8/6/24    Pneumonia 07/19/2024    past history    Acute transverse myelitis (HCC)     Allergies     Hypothyroidism     Insomnia     MS (multiple sclerosis) (HCC)     Teratoma of right ovary      Past surgical history:  Past Surgical History:   Procedure Laterality Date    HEMORRHOIDECTOMY  08/30/2021    ABDOMINAL HYSTERECTOMY TOTAL  03/05/2015    ABDOMINAL EXPLORATION  12/23/2014    EXPLORATORY LAPAROTOMY  2014    TUBAL COAGULATION LAPAROSCOPIC BILATERAL  01/01/2000     Medications:has a current medication list which includes the following prescription(s): pyridoxine hcl, cholecalciferol, hair skin and nails formula, calcium carb-cholecalciferol, fluticasone, lisinopril, loratadine, levothyroxine, epinephrine, ofatumumab, estradiol, duloxetine, baclofen, albuterol, metronidazole, gabapentin, multiple vitamin, and ibuprofen.  Allergies:Lamotrigine, Food, Nickel, Aspirin, Latex, Neosporin [bacitracin-polymyxin b], Other food, Penicillins, Sulfa  drugs, and Teriflunomide  Family history:No family history on file.  Social history: reports that she has never smoked. She has been exposed to tobacco smoke. She has never used smokeless tobacco. She reports that she does not currently use alcohol. She reports that she does not use drugs.    Review of systems: A full review of systems was performed, and negative with the exception of want is noted above in the HPI.        Objective        There were no vitals taken for this visit.    Physical Exam  Vitals reviewed. Exam conducted with a chaperone present (MA - see notes.).   Constitutional:       Appearance: Normal appearance.   HENT:      Head: Normocephalic.      Mouth/Throat:      Mouth: Mucous membranes are moist.   Cardiovascular:      Rate and Rhythm: Normal rate.   Pulmonary:      Effort: Pulmonary effort is normal.   Abdominal:      Palpations: Abdomen is soft. There is no mass.      Tenderness: There is no abdominal tenderness.   Skin:     General: Skin is warm and dry.   Neurological:      Mental Status: She is alert.   Psychiatric:         Mood and Affect: Mood normal.       Genitourinary:    Vulva: WNL   Bulbocavernosus reflex: Intact   Anal wink reflex: Intact   Perineal sensation: Decreased   Urethra: Atrophic   Vagina: Atrophic   Atrophy: Mild   Cough stress test: Negative    Pelvic floor: prior   POP-Q: Aa -1.5 / Ba -1.5 / TVL 7 / C -4 / D x / Ap +1 / Bp +1 / GH 5 / PB 2   Large posterior enterocele   Prolapse stage: 2   Bladder/ suprapubic tenderness: No    Levator tenderness: None   Levator muscle tone: Hypertonic   Pelvic floor contraction strength (modified Oxford scale): 2=Weak   Pelvic floor contraction duration: Brief    Bimanual exam: No Palpable Adnexal Masses    Procedure Performed:       Diagnostic test and records review:      Post-void residual: See UDS report    Labs: n/a    Radiology: n/a    Procedural:       Urodynamics:  Filling phase: Normal capacity and compliance.  Stress  incontinence not demonstrated although lower pressures mounted during testing.  Uninhibited detrusor contractions without leak.  Voiding phase: Complete emptying on initial uroflow and normal flow pattern, majority emptying on pressure flow with detrusor contraction mounted.  No significant signs of dyssynergia     Documentation reviewed: Prior EMR Records         Assessment & Plan     Roxann Houston is a 54 y.o. P3 with multiple sclerosis, recurrent symptomatic prolapse and stress predominant incontinence.     1. Vaginal vault prolapse  2. Cystocele, midline  3. Rectocele  We reviewed counseling from her prior visits including below: At her prior visit as well as today we reviewed all treatment options for prolapse including conservative/observation, pessary, and both vaginal and laparoscopic approaches as well as native tissue and mesh augmented approaches. After detailed counseling about all prolapse treatment options and shared decision-making, she opts for a mesh augmented reconstructive approach to prolapse repair via  Robotic sacrocolpopexy, posterior repair/perineorrhaphy, mid urethral sling, and all indicated procedures.  She has reviewed all preoperative written materials and expressed understanding about the procedure, risks, benefits, and recovery    Pre-operative urodynamics did not demonstrate stress urinary incontinence with reduction of prolapse.,  But she mounted low pressures in the UDS position, and does have bother from stress incontinence and was counseled on observation versus concomitant sling procedure versus bulking.  She would like to move forward with a concomitant sling procedure in order to address all components of her pelvic floor dysfunction at the time of surgery.     Benefits of surgery were reviewed, including functional outcomes (bladder/bowel/sexual). Risks of surgery were  also discussed including anesthesia, bleeding, infection, damage to surrounding organs (bladder, ureter,  urethra, bowel, blood vessel, nerves), possible blood transfusion, recurrent prolapse, transient buttock pain if sacrospinous suspension performed, mesh exposure/erosion, transient voiding dysfunction requiring catheterization, new/worsening urinary incontinence, pain with sex.     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 She will likely go home on the same day as the surgery. She was counseled on what to expect in the recovery room including voiding trial and possible vaginal packing removal, as well as what to expect if voiding trial is unsuccessful, which would be transient catheterization.   Discussed trajectory of recovery, including maximizing NSAIDs and Tylenol, and that narcotics are not routinely given.  Discussed restrictions including heavy lifting that requires straining, driving while on narcotics, nothing in the vagina and no bathing/swimming for at least 6 weeks until evaluated in the office.  Return to work discussed.  *Please see the corresponding after visit summary counseling packet for detailed counseling provided for the patient.     Pre-op meds: acetaminophen 1000mg PO, phenazopyridine 200mg PO, Cefazolin 2gm IV   Post-op medication plan: ibuprofen, tylenol, miralax   Home medication review: She should additionally hold all NSAIDs and supplements for 1 week prior to surgery.  Final dose of ofatumumab 2 weeks before surgery          4. Stress incontinence  5. Voiding difficulty  6. Multiple sclerosis (HCC)  Reviewed the below counseling again.  As noted above, urodynamics did not demonstrate stress incontinence but she was not able to mount significant Valsalva pressures during the examination.  She has subjective stress leakages which is a reliable symptom.  She is given the observation of observation versus cotreatment at the time of surgery and she would prefer to treat her  stress incontinence with a sling at the time of surgery.  Understands risk benefits alternatives of this procedure.    7. Genitourinary syndrome of menopause  Continue vaginal estrogen twice weekly, hold 2 weeks postop then resume                 Satnam Perera MD, FACOG  Urogynecology and Reconstructive Pelvic Surgery  Department of Obstetrics and Gynecology  [facility]        This medical record contains text that has been entered with the assistance of computer voice recognition and dictation software.  Therefore, it may contain unintended errors in text, spelling, punctuation, or grammar

## 2024-09-27 ENCOUNTER — PRE-ADMISSION TESTING (OUTPATIENT)
Dept: ADMISSIONS | Facility: MEDICAL CENTER | Age: 54
End: 2024-09-27
Attending: STUDENT IN AN ORGANIZED HEALTH CARE EDUCATION/TRAINING PROGRAM
Payer: MEDICAID

## 2024-10-23 ENCOUNTER — ANESTHESIA EVENT (OUTPATIENT)
Dept: SURGERY | Facility: MEDICAL CENTER | Age: 54
End: 2024-10-23
Payer: MEDICAID

## 2024-10-23 ENCOUNTER — HOSPITAL ENCOUNTER (OUTPATIENT)
Facility: MEDICAL CENTER | Age: 54
End: 2024-10-23
Attending: STUDENT IN AN ORGANIZED HEALTH CARE EDUCATION/TRAINING PROGRAM | Admitting: STUDENT IN AN ORGANIZED HEALTH CARE EDUCATION/TRAINING PROGRAM
Payer: MEDICAID

## 2024-10-23 ENCOUNTER — ANESTHESIA (OUTPATIENT)
Dept: SURGERY | Facility: MEDICAL CENTER | Age: 54
End: 2024-10-23
Payer: MEDICAID

## 2024-10-23 VITALS
DIASTOLIC BLOOD PRESSURE: 63 MMHG | HEART RATE: 77 BPM | WEIGHT: 221.56 LBS | RESPIRATION RATE: 15 BRPM | SYSTOLIC BLOOD PRESSURE: 110 MMHG | BODY MASS INDEX: 34.78 KG/M2 | HEIGHT: 67 IN | TEMPERATURE: 97.3 F | OXYGEN SATURATION: 91 %

## 2024-10-23 LAB
ANION GAP SERPL CALC-SCNC: 13 MMOL/L (ref 7–16)
BUN SERPL-MCNC: 18 MG/DL (ref 8–22)
CALCIUM SERPL-MCNC: 9.2 MG/DL (ref 8.5–10.5)
CHLORIDE SERPL-SCNC: 106 MMOL/L (ref 96–112)
CO2 SERPL-SCNC: 21 MMOL/L (ref 20–33)
CREAT SERPL-MCNC: 0.55 MG/DL (ref 0.5–1.4)
ERYTHROCYTE [DISTWIDTH] IN BLOOD BY AUTOMATED COUNT: 44 FL (ref 35.9–50)
GFR SERPLBLD CREATININE-BSD FMLA CKD-EPI: 108 ML/MIN/1.73 M 2
GLUCOSE SERPL-MCNC: 128 MG/DL (ref 65–99)
HCT VFR BLD AUTO: 38.5 % (ref 37–47)
HGB BLD-MCNC: 12.4 G/DL (ref 12–16)
MCH RBC QN AUTO: 29.4 PG (ref 27–33)
MCHC RBC AUTO-ENTMCNC: 32.2 G/DL (ref 32.2–35.5)
MCV RBC AUTO: 91.2 FL (ref 81.4–97.8)
PATHOLOGY CONSULT NOTE: NORMAL
PLATELET # BLD AUTO: 241 K/UL (ref 164–446)
PMV BLD AUTO: 9.2 FL (ref 9–12.9)
POTASSIUM SERPL-SCNC: 4.3 MMOL/L (ref 3.6–5.5)
RBC # BLD AUTO: 4.22 M/UL (ref 4.2–5.4)
SODIUM SERPL-SCNC: 140 MMOL/L (ref 135–145)
WBC # BLD AUTO: 5.4 K/UL (ref 4.8–10.8)

## 2024-10-23 PROCEDURE — 160009 HCHG ANES TIME/MIN: Performed by: STUDENT IN AN ORGANIZED HEALTH CARE EDUCATION/TRAINING PROGRAM

## 2024-10-23 PROCEDURE — 160042 HCHG SURGERY MINUTES - EA ADDL 1 MIN LEVEL 5: Performed by: STUDENT IN AN ORGANIZED HEALTH CARE EDUCATION/TRAINING PROGRAM

## 2024-10-23 PROCEDURE — 160025 RECOVERY II MINUTES (STATS): Performed by: STUDENT IN AN ORGANIZED HEALTH CARE EDUCATION/TRAINING PROGRAM

## 2024-10-23 PROCEDURE — 110454 HCHG SHELL REV 250: Performed by: STUDENT IN AN ORGANIZED HEALTH CARE EDUCATION/TRAINING PROGRAM

## 2024-10-23 PROCEDURE — 58661 LAPAROSCOPY REMOVE ADNEXA: CPT | Performed by: STUDENT IN AN ORGANIZED HEALTH CARE EDUCATION/TRAINING PROGRAM

## 2024-10-23 PROCEDURE — 57250 REPAIR RECTUM & VAGINA: CPT | Performed by: STUDENT IN AN ORGANIZED HEALTH CARE EDUCATION/TRAINING PROGRAM

## 2024-10-23 PROCEDURE — 160002 HCHG RECOVERY MINUTES (STAT): Performed by: STUDENT IN AN ORGANIZED HEALTH CARE EDUCATION/TRAINING PROGRAM

## 2024-10-23 PROCEDURE — 85027 COMPLETE CBC AUTOMATED: CPT

## 2024-10-23 PROCEDURE — 58661 LAPAROSCOPY REMOVE ADNEXA: CPT | Mod: 80 | Performed by: STUDENT IN AN ORGANIZED HEALTH CARE EDUCATION/TRAINING PROGRAM

## 2024-10-23 PROCEDURE — 160048 HCHG OR STATISTICAL LEVEL 1-5: Performed by: STUDENT IN AN ORGANIZED HEALTH CARE EDUCATION/TRAINING PROGRAM

## 2024-10-23 PROCEDURE — 57425 LAPAROSCOPY SURG COLPOPEXY: CPT | Mod: 80 | Performed by: STUDENT IN AN ORGANIZED HEALTH CARE EDUCATION/TRAINING PROGRAM

## 2024-10-23 PROCEDURE — 160036 HCHG PACU - EA ADDL 30 MINS PHASE I: Performed by: STUDENT IN AN ORGANIZED HEALTH CARE EDUCATION/TRAINING PROGRAM

## 2024-10-23 PROCEDURE — C1771 REP DEV, URINARY, W/SLING: HCPCS | Performed by: STUDENT IN AN ORGANIZED HEALTH CARE EDUCATION/TRAINING PROGRAM

## 2024-10-23 PROCEDURE — 160047 HCHG PACU  - EA ADDL 30 MINS PHASE II: Performed by: STUDENT IN AN ORGANIZED HEALTH CARE EDUCATION/TRAINING PROGRAM

## 2024-10-23 PROCEDURE — 88305 TISSUE EXAM BY PATHOLOGIST: CPT

## 2024-10-23 PROCEDURE — 700101 HCHG RX REV CODE 250: Mod: UD | Performed by: ANESTHESIOLOGY

## 2024-10-23 PROCEDURE — 57250 REPAIR RECTUM & VAGINA: CPT | Mod: 80 | Performed by: STUDENT IN AN ORGANIZED HEALTH CARE EDUCATION/TRAINING PROGRAM

## 2024-10-23 PROCEDURE — C1781 MESH (IMPLANTABLE): HCPCS | Performed by: STUDENT IN AN ORGANIZED HEALTH CARE EDUCATION/TRAINING PROGRAM

## 2024-10-23 PROCEDURE — A9270 NON-COVERED ITEM OR SERVICE: HCPCS | Mod: UD | Performed by: STUDENT IN AN ORGANIZED HEALTH CARE EDUCATION/TRAINING PROGRAM

## 2024-10-23 PROCEDURE — 57425 LAPAROSCOPY SURG COLPOPEXY: CPT | Mod: 22 | Performed by: STUDENT IN AN ORGANIZED HEALTH CARE EDUCATION/TRAINING PROGRAM

## 2024-10-23 PROCEDURE — 57288 REPAIR BLADDER DEFECT: CPT | Mod: 80 | Performed by: STUDENT IN AN ORGANIZED HEALTH CARE EDUCATION/TRAINING PROGRAM

## 2024-10-23 PROCEDURE — 36415 COLL VENOUS BLD VENIPUNCTURE: CPT

## 2024-10-23 PROCEDURE — 700111 HCHG RX REV CODE 636 W/ 250 OVERRIDE (IP): Mod: UD | Performed by: ANESTHESIOLOGY

## 2024-10-23 PROCEDURE — 502714 HCHG ROBOTIC SURGERY SERVICES: Performed by: STUDENT IN AN ORGANIZED HEALTH CARE EDUCATION/TRAINING PROGRAM

## 2024-10-23 PROCEDURE — 160031 HCHG SURGERY MINUTES - 1ST 30 MINS LEVEL 5: Performed by: STUDENT IN AN ORGANIZED HEALTH CARE EDUCATION/TRAINING PROGRAM

## 2024-10-23 PROCEDURE — 700111 HCHG RX REV CODE 636 W/ 250 OVERRIDE (IP): Mod: UD | Performed by: STUDENT IN AN ORGANIZED HEALTH CARE EDUCATION/TRAINING PROGRAM

## 2024-10-23 PROCEDURE — 80048 BASIC METABOLIC PNL TOTAL CA: CPT

## 2024-10-23 PROCEDURE — 160046 HCHG PACU - 1ST 60 MINS PHASE II: Performed by: STUDENT IN AN ORGANIZED HEALTH CARE EDUCATION/TRAINING PROGRAM

## 2024-10-23 PROCEDURE — 57288 REPAIR BLADDER DEFECT: CPT | Performed by: STUDENT IN AN ORGANIZED HEALTH CARE EDUCATION/TRAINING PROGRAM

## 2024-10-23 PROCEDURE — 700105 HCHG RX REV CODE 258: Mod: UD | Performed by: STUDENT IN AN ORGANIZED HEALTH CARE EDUCATION/TRAINING PROGRAM

## 2024-10-23 PROCEDURE — 700102 HCHG RX REV CODE 250 W/ 637 OVERRIDE(OP): Mod: UD | Performed by: STUDENT IN AN ORGANIZED HEALTH CARE EDUCATION/TRAINING PROGRAM

## 2024-10-23 PROCEDURE — 700101 HCHG RX REV CODE 250: Mod: UD | Performed by: STUDENT IN AN ORGANIZED HEALTH CARE EDUCATION/TRAINING PROGRAM

## 2024-10-23 PROCEDURE — 160035 HCHG PACU - 1ST 60 MINS PHASE I: Performed by: STUDENT IN AN ORGANIZED HEALTH CARE EDUCATION/TRAINING PROGRAM

## 2024-10-23 DEVICE — SYSTEM TRANSVAGINAL MID URETHRAL SLING ADVANTAGE FIT BLUE (1EA): Type: IMPLANTABLE DEVICE | Site: URETHRA | Status: FUNCTIONAL

## 2024-10-23 RX ORDER — PHENAZOPYRIDINE HYDROCHLORIDE 200 MG/1
200 TABLET, FILM COATED ORAL
Status: COMPLETED | OUTPATIENT
Start: 2024-10-23 | End: 2024-10-23

## 2024-10-23 RX ORDER — BUPIVACAINE HYDROCHLORIDE 2.5 MG/ML
INJECTION, SOLUTION EPIDURAL; INFILTRATION; INTRACAUDAL
Status: DISCONTINUED | OUTPATIENT
Start: 2024-10-23 | End: 2024-10-23 | Stop reason: HOSPADM

## 2024-10-23 RX ORDER — METOPROLOL TARTRATE 1 MG/ML
1 INJECTION, SOLUTION INTRAVENOUS
Status: DISCONTINUED | OUTPATIENT
Start: 2024-10-23 | End: 2024-10-23 | Stop reason: HOSPADM

## 2024-10-23 RX ORDER — EPHEDRINE SULFATE 50 MG/ML
5 INJECTION, SOLUTION INTRAVENOUS
Status: DISCONTINUED | OUTPATIENT
Start: 2024-10-23 | End: 2024-10-23 | Stop reason: HOSPADM

## 2024-10-23 RX ORDER — DIPHENHYDRAMINE HYDROCHLORIDE 50 MG/ML
12.5 INJECTION INTRAMUSCULAR; INTRAVENOUS
Status: DISCONTINUED | OUTPATIENT
Start: 2024-10-23 | End: 2024-10-23 | Stop reason: HOSPADM

## 2024-10-23 RX ORDER — HYDRALAZINE HYDROCHLORIDE 20 MG/ML
INJECTION INTRAMUSCULAR; INTRAVENOUS PRN
Status: DISCONTINUED | OUTPATIENT
Start: 2024-10-23 | End: 2024-10-23 | Stop reason: SURG

## 2024-10-23 RX ORDER — OXYCODONE HCL 5 MG/5 ML
10 SOLUTION, ORAL ORAL
Status: DISCONTINUED | OUTPATIENT
Start: 2024-10-23 | End: 2024-10-23 | Stop reason: HOSPADM

## 2024-10-23 RX ORDER — KETOROLAC TROMETHAMINE 15 MG/ML
INJECTION, SOLUTION INTRAMUSCULAR; INTRAVENOUS PRN
Status: DISCONTINUED | OUTPATIENT
Start: 2024-10-23 | End: 2024-10-23 | Stop reason: SURG

## 2024-10-23 RX ORDER — HYDRALAZINE HYDROCHLORIDE 20 MG/ML
5 INJECTION INTRAMUSCULAR; INTRAVENOUS
Status: DISCONTINUED | OUTPATIENT
Start: 2024-10-23 | End: 2024-10-23 | Stop reason: HOSPADM

## 2024-10-23 RX ORDER — LIDOCAINE HYDROCHLORIDE 20 MG/ML
INJECTION, SOLUTION EPIDURAL; INFILTRATION; INTRACAUDAL; PERINEURAL PRN
Status: DISCONTINUED | OUTPATIENT
Start: 2024-10-23 | End: 2024-10-23 | Stop reason: SURG

## 2024-10-23 RX ORDER — HYDROMORPHONE HYDROCHLORIDE 1 MG/ML
0.4 INJECTION, SOLUTION INTRAMUSCULAR; INTRAVENOUS; SUBCUTANEOUS
Status: DISCONTINUED | OUTPATIENT
Start: 2024-10-23 | End: 2024-10-23 | Stop reason: HOSPADM

## 2024-10-23 RX ORDER — ALBUTEROL SULFATE 5 MG/ML
2.5 SOLUTION RESPIRATORY (INHALATION)
Status: DISCONTINUED | OUTPATIENT
Start: 2024-10-23 | End: 2024-10-23 | Stop reason: HOSPADM

## 2024-10-23 RX ORDER — OXYCODONE HCL 5 MG/5 ML
5 SOLUTION, ORAL ORAL
Status: DISCONTINUED | OUTPATIENT
Start: 2024-10-23 | End: 2024-10-23 | Stop reason: HOSPADM

## 2024-10-23 RX ORDER — ONDANSETRON 2 MG/ML
4 INJECTION INTRAMUSCULAR; INTRAVENOUS
Status: DISCONTINUED | OUTPATIENT
Start: 2024-10-23 | End: 2024-10-23 | Stop reason: HOSPADM

## 2024-10-23 RX ORDER — HYDROMORPHONE HYDROCHLORIDE 1 MG/ML
0.1 INJECTION, SOLUTION INTRAMUSCULAR; INTRAVENOUS; SUBCUTANEOUS
Status: DISCONTINUED | OUTPATIENT
Start: 2024-10-23 | End: 2024-10-23 | Stop reason: HOSPADM

## 2024-10-23 RX ORDER — ONDANSETRON 2 MG/ML
INJECTION INTRAMUSCULAR; INTRAVENOUS PRN
Status: DISCONTINUED | OUTPATIENT
Start: 2024-10-23 | End: 2024-10-23 | Stop reason: SURG

## 2024-10-23 RX ORDER — SODIUM CHLORIDE 9 MG/ML
INJECTION, SOLUTION INTRAVENOUS ONCE
Status: COMPLETED | OUTPATIENT
Start: 2024-10-23 | End: 2024-10-23

## 2024-10-23 RX ORDER — SODIUM CHLORIDE 9 MG/ML
INJECTION, SOLUTION INTRAVENOUS CONTINUOUS
Status: DISCONTINUED | OUTPATIENT
Start: 2024-10-23 | End: 2024-10-23 | Stop reason: HOSPADM

## 2024-10-23 RX ORDER — ROCURONIUM BROMIDE 10 MG/ML
INJECTION, SOLUTION INTRAVENOUS PRN
Status: DISCONTINUED | OUTPATIENT
Start: 2024-10-23 | End: 2024-10-23 | Stop reason: SURG

## 2024-10-23 RX ORDER — HALOPERIDOL 5 MG/ML
1 INJECTION INTRAMUSCULAR
Status: DISCONTINUED | OUTPATIENT
Start: 2024-10-23 | End: 2024-10-23 | Stop reason: HOSPADM

## 2024-10-23 RX ORDER — MIDAZOLAM HYDROCHLORIDE 1 MG/ML
INJECTION INTRAMUSCULAR; INTRAVENOUS PRN
Status: DISCONTINUED | OUTPATIENT
Start: 2024-10-23 | End: 2024-10-23 | Stop reason: SURG

## 2024-10-23 RX ORDER — HYDROMORPHONE HYDROCHLORIDE 1 MG/ML
0.2 INJECTION, SOLUTION INTRAMUSCULAR; INTRAVENOUS; SUBCUTANEOUS
Status: DISCONTINUED | OUTPATIENT
Start: 2024-10-23 | End: 2024-10-23 | Stop reason: HOSPADM

## 2024-10-23 RX ORDER — CEFAZOLIN SODIUM 1 G/3ML
INJECTION, POWDER, FOR SOLUTION INTRAMUSCULAR; INTRAVENOUS PRN
Status: DISCONTINUED | OUTPATIENT
Start: 2024-10-23 | End: 2024-10-23 | Stop reason: SURG

## 2024-10-23 RX ORDER — METOPROLOL TARTRATE 1 MG/ML
INJECTION, SOLUTION INTRAVENOUS PRN
Status: DISCONTINUED | OUTPATIENT
Start: 2024-10-23 | End: 2024-10-23 | Stop reason: SURG

## 2024-10-23 RX ORDER — GENTAMICIN 40 MG/ML
INJECTION, SOLUTION INTRAMUSCULAR; INTRAVENOUS
Status: DISCONTINUED | OUTPATIENT
Start: 2024-10-23 | End: 2024-10-23 | Stop reason: HOSPADM

## 2024-10-23 RX ORDER — DEXAMETHASONE SODIUM PHOSPHATE 4 MG/ML
INJECTION, SOLUTION INTRA-ARTICULAR; INTRALESIONAL; INTRAMUSCULAR; INTRAVENOUS; SOFT TISSUE PRN
Status: DISCONTINUED | OUTPATIENT
Start: 2024-10-23 | End: 2024-10-23 | Stop reason: SURG

## 2024-10-23 RX ORDER — VANCOMYCIN HYDROCHLORIDE 500 MG/10ML
INJECTION, POWDER, LYOPHILIZED, FOR SOLUTION INTRAVENOUS
Status: COMPLETED | OUTPATIENT
Start: 2024-10-23 | End: 2024-10-23

## 2024-10-23 RX ORDER — ACETAMINOPHEN 500 MG
1000 TABLET ORAL EVERY 6 HOURS PRN
COMMUNITY

## 2024-10-23 RX ORDER — ACETAMINOPHEN 500 MG
1000 TABLET ORAL
Status: COMPLETED | OUTPATIENT
Start: 2024-10-23 | End: 2024-10-23

## 2024-10-23 RX ADMIN — ACETAMINOPHEN 1000 MG: 500 TABLET ORAL at 08:19

## 2024-10-23 RX ADMIN — HYDRALAZINE HYDROCHLORIDE 5 MG: 20 INJECTION, SOLUTION INTRAMUSCULAR; INTRAVENOUS at 10:47

## 2024-10-23 RX ADMIN — SODIUM CHLORIDE: 9 INJECTION, SOLUTION INTRAVENOUS at 09:27

## 2024-10-23 RX ADMIN — PROPOFOL 20 MG: 10 INJECTION, EMULSION INTRAVENOUS at 12:17

## 2024-10-23 RX ADMIN — DEXAMETHASONE SODIUM PHOSPHATE 8 MG: 4 INJECTION INTRA-ARTICULAR; INTRALESIONAL; INTRAMUSCULAR; INTRAVENOUS; SOFT TISSUE at 09:54

## 2024-10-23 RX ADMIN — ROCURONIUM BROMIDE 5 MG: 50 INJECTION, SOLUTION INTRAVENOUS at 11:26

## 2024-10-23 RX ADMIN — FENTANYL CITRATE 50 MCG: 50 INJECTION, SOLUTION INTRAMUSCULAR; INTRAVENOUS at 12:14

## 2024-10-23 RX ADMIN — FENTANYL CITRATE 25 MCG: 50 INJECTION, SOLUTION INTRAMUSCULAR; INTRAVENOUS at 11:26

## 2024-10-23 RX ADMIN — FENTANYL CITRATE 25 MCG: 50 INJECTION, SOLUTION INTRAMUSCULAR; INTRAVENOUS at 13:01

## 2024-10-23 RX ADMIN — PROPOFOL 10 MG: 10 INJECTION, EMULSION INTRAVENOUS at 13:02

## 2024-10-23 RX ADMIN — FENTANYL CITRATE 50 MCG: 50 INJECTION, SOLUTION INTRAMUSCULAR; INTRAVENOUS at 10:00

## 2024-10-23 RX ADMIN — KETOROLAC TROMETHAMINE 15 MG: 15 INJECTION, SOLUTION INTRAMUSCULAR; INTRAVENOUS at 13:07

## 2024-10-23 RX ADMIN — ROCURONIUM BROMIDE 10 MG: 50 INJECTION, SOLUTION INTRAVENOUS at 12:08

## 2024-10-23 RX ADMIN — PHENAZOPYRIDINE 200 MG: 200 TABLET ORAL at 08:19

## 2024-10-23 RX ADMIN — FENTANYL CITRATE 50 MCG: 50 INJECTION, SOLUTION INTRAMUSCULAR; INTRAVENOUS at 10:45

## 2024-10-23 RX ADMIN — METOPROLOL TARTRATE 2 MG: 5 INJECTION INTRAVENOUS at 10:04

## 2024-10-23 RX ADMIN — LIDOCAINE HYDROCHLORIDE 50 MG: 20 INJECTION, SOLUTION EPIDURAL; INFILTRATION; INTRACAUDAL at 09:32

## 2024-10-23 RX ADMIN — PROPOFOL 170 MG: 10 INJECTION, EMULSION INTRAVENOUS at 09:32

## 2024-10-23 RX ADMIN — ROCURONIUM BROMIDE 50 MG: 50 INJECTION, SOLUTION INTRAVENOUS at 09:32

## 2024-10-23 RX ADMIN — ROCURONIUM BROMIDE 10 MG: 50 INJECTION, SOLUTION INTRAVENOUS at 11:43

## 2024-10-23 RX ADMIN — FENTANYL CITRATE 50 MCG: 50 INJECTION, SOLUTION INTRAMUSCULAR; INTRAVENOUS at 10:14

## 2024-10-23 RX ADMIN — HYDRALAZINE HYDROCHLORIDE 5 MG: 20 INJECTION, SOLUTION INTRAMUSCULAR; INTRAVENOUS at 12:15

## 2024-10-23 RX ADMIN — ONDANSETRON 4 MG: 2 INJECTION INTRAMUSCULAR; INTRAVENOUS at 10:09

## 2024-10-23 RX ADMIN — FENTANYL CITRATE 25 MCG: 50 INJECTION, SOLUTION INTRAMUSCULAR; INTRAVENOUS at 11:43

## 2024-10-23 RX ADMIN — ROCURONIUM BROMIDE 10 MG: 50 INJECTION, SOLUTION INTRAVENOUS at 11:00

## 2024-10-23 RX ADMIN — MIDAZOLAM HYDROCHLORIDE 2 MG: 2 INJECTION, SOLUTION INTRAMUSCULAR; INTRAVENOUS at 09:30

## 2024-10-23 RX ADMIN — SUGAMMADEX 200 MG: 100 INJECTION, SOLUTION INTRAVENOUS at 13:09

## 2024-10-23 RX ADMIN — FENTANYL CITRATE 25 MCG: 50 INJECTION, SOLUTION INTRAMUSCULAR; INTRAVENOUS at 12:31

## 2024-10-23 RX ADMIN — LIDOCAINE HYDROCHLORIDE 50 MG: 20 INJECTION, SOLUTION EPIDURAL; INFILTRATION; INTRACAUDAL at 13:01

## 2024-10-23 RX ADMIN — ROCURONIUM BROMIDE 10 MG: 50 INJECTION, SOLUTION INTRAVENOUS at 10:24

## 2024-10-23 RX ADMIN — CEFAZOLIN 2 G: 1 INJECTION, POWDER, FOR SOLUTION INTRAMUSCULAR; INTRAVENOUS at 09:40

## 2024-10-23 ASSESSMENT — PAIN DESCRIPTION - PAIN TYPE
TYPE: SURGICAL PAIN

## 2024-10-24 ENCOUNTER — TELEPHONE (OUTPATIENT)
Dept: GYNECOLOGY | Facility: CLINIC | Age: 54
End: 2024-10-24
Payer: MEDICAID

## 2024-11-05 ENCOUNTER — TELEPHONE (OUTPATIENT)
Dept: OBGYN | Facility: CLINIC | Age: 54
End: 2024-11-05
Payer: MEDICAID

## 2024-11-05 NOTE — TELEPHONE ENCOUNTER
Pt called stating that her urine smelled sweet. Pt stated she was advised to call if there was an odor to her urine. Consulted with KAELA Bynum, to review  symptoms.   Pt denied fever, chills, aches,  burning,  itching, burning with urination,  nausea,  vomitting, LBP, shortness  of  breath. Pt  stated  no UTI  symptoms,  only the odor of her urine smelling sweet for the past 3 days.   Advised pt a urine culture order would be placed and she is able to drop  off a sample to a [facility] in [place] where she resides. Pt verbalized understanding and no further questions.   Call documentation forwarded to KAELA Bynum and Dr. Perera.

## 2024-12-06 ENCOUNTER — GYNECOLOGY VISIT (OUTPATIENT)
Dept: GYNECOLOGY | Facility: CLINIC | Age: 54
End: 2024-12-06
Payer: MEDICAID

## 2024-12-06 VITALS — DIASTOLIC BLOOD PRESSURE: 80 MMHG | SYSTOLIC BLOOD PRESSURE: 138 MMHG | HEART RATE: 66 BPM

## 2024-12-06 DIAGNOSIS — Z98.890 POSTOPERATIVE STATE: ICD-10-CM

## 2024-12-06 PROBLEM — N81.9 VAGINAL VAULT PROLAPSE: Status: RESOLVED | Noted: 2024-05-20 | Resolved: 2024-12-06

## 2024-12-06 PROBLEM — N81.11 CYSTOCELE, MIDLINE: Status: RESOLVED | Noted: 2024-05-20 | Resolved: 2024-12-06

## 2024-12-06 PROBLEM — N39.3 STRESS INCONTINENCE: Status: RESOLVED | Noted: 2024-05-20 | Resolved: 2024-12-06

## 2024-12-06 PROBLEM — N81.6 RECTOCELE: Status: RESOLVED | Noted: 2024-05-20 | Resolved: 2024-12-06

## 2024-12-06 PROCEDURE — 99024 POSTOP FOLLOW-UP VISIT: CPT | Performed by: STUDENT IN AN ORGANIZED HEALTH CARE EDUCATION/TRAINING PROGRAM

## 2024-12-06 NOTE — PROGRESS NOTES
Urogynecology & Reconstructive Pelvic Surgery - Follow Up    Roxann Houston MRN:[id number] :1970    Referred by: Jonathan Villatoro DO    Reason for Visit:   Chief Complaint   Patient presents with    Post-op     Surgery 10/23/24      This evaluation was conducted via Zoom using secure and encrypted videoconferencing technology. The patient was in a private location at her home in the [facility].  The patient's identity was confirmed and verbal consent was obtained for this virtual visit.      Subjective     History of Presenting Illness:    Roxann Houston is a 54 y.o. P3 with MS with prolapse and incontinence who presents for follow-up 6 weeks s/p robotic sacrocolpopexy, lysis of adhesions, left oophorectomy, posterior pair/perineorrhaphy, retropubic mid urethral sling    She had an overall unremarkable recovery, pain well-tolerated.  She feels better supported after surgery, significantly decreased leakage with physical activity.  Visions healing well.  Has no trouble emptying her bladder        Initial HPI: She presents for the evaluation and management of prolapse.     She is bothered by a vaginal bulge which is worsened over the years.  She previously underwent a repair at the time of her hysterectomy but this has recurred.  She has history of back spasms, attributed to her multiple sclerosis.    Also has a history of a hemorrhoidectomy (internal banding, PowerPort), and is now started to notice rectal bleeding again when she strains    Prior Pelvic surgery:   2015 hysterectomy, prolapse repair - op report note available  10/2024: robotic sacrocolpopexy, lysis of adhesions, left oophorectomy, posterior pair/perineorrhaphy, retropubic mid urethral sling ([facility])     Prior treatment:   none    Pelvic floor symptom review:     Bladder:   Voids per day: 4-8 Voids per night: 1-2      Urinary incontinence episodes per day: varies    Urge leakage:  None   Stress leakage: With Cough and With Laugh -->  improved   Continuous / insensible urine loss: No    Nocturnal enuresis: No    Leakage volume: Drops   Bladder emptying: Incomplete--> improved   Voiding symptoms: Hesitancy and Post-Void Dribble   UTI in last 12 months: no   Other urologic history: no      Prolapse:     Prolapse symptoms: Resolved     Bowel:    Constipation: Yes   Bowel movements per day: daily    Straining to empty bowels: No    Splinting to evacuate: Yes   Painful evacuation: Yes   Difficulty emptying rectum: Yes   Incontinence to stool: no  H/o hemorrhoidectomy (internal banding, c/b transfucion)  New        Sexual function:    Sexually active: Yes   Gender of partners: Male   Pain with intercourse: No      Past medical and surgical history    Past obstetric history    Past medical history:  Past Medical History:   Diagnosis Date    Hypertension 07/19/2024    states controlled on medication    Urinary incontinence 07/19/2024    stress incontinence    Dental disorder 07/19/2024    dental extraction scheduled for 8/6/24    Pneumonia 07/19/2024    past history    Acute transverse myelitis (HCC)     Allergies     Hypothyroidism     Insomnia     MS (multiple sclerosis) (HCC)     Teratoma of right ovary      Past surgical history:  Past Surgical History:   Procedure Laterality Date    AR LAPAROSCOPY, SURG, COLPOPEXY  10/23/2024    Procedure: ROBOTIC SACROCOLPOPEXY;  Surgeon: Satnam Perera M.D.;  Location: [facility];  Service: Gyn Robotic    AR POST COLPORRHAPHY,RECTUM/VAGINA N/A 10/23/2024    Procedure: POSTERIOR REPAIR / PERINEORRHAPHY;  Surgeon: Satnam Perera M.D.;  Location: [facility];  Service: Gyn Robotic    AR CYSTOURETHROSCOPY  10/23/2024    Procedure: CYSTOURETHROSCOPY;  Surgeon: Satnam Perera M.D.;  Location: [facility];  Service: Gyn Robotic    BLADDER SLING FEMALE  10/23/2024    Procedure: MID URETHERAL SLING;  Surgeon: Satnam Perera M.D.;  Location: [facility];  Service: Gyn Robotic    OOPHORECTOMY  Left 10/23/2024    Procedure: OOPHORECTOMY;  Surgeon: Satnam Perera M.D.;  Location: [facility] [facility];  Service: Gyn Robotic    HEMORRHOIDECTOMY  08/30/2021    ABDOMINAL HYSTERECTOMY TOTAL  03/05/2015    ABDOMINAL EXPLORATION  12/23/2014    EXPLORATORY LAPAROTOMY  2014    TUBAL COAGULATION LAPAROSCOPIC BILATERAL  01/01/2000    DENTAL EXTRACTION(S)      all teeth removed 8/6/2024     Medications:has a current medication list which includes the following prescription(s): acetaminophen, pyridoxine hcl, cholecalciferol, hair skin and nails formula, calcium carb-cholecalciferol, fluticasone, lisinopril, loratadine, levothyroxine, epinephrine, ofatumumab, estradiol, duloxetine, baclofen, albuterol, metronidazole, gabapentin, multiple vitamin, and ibuprofen.  Allergies:Bee venom, Food, Lamotrigine, Nickel, Penicillins, Sulfa drugs, Teriflunomide, Aspirin, Latex, Neosporin [bacitracin-polymyxin b], and Other food  Family history:No family history on file.  Social history: reports that she has never smoked. She has been exposed to tobacco smoke. She has never used smokeless tobacco. She reports that she does not currently use alcohol. She reports that she does not use drugs.    Review of systems: A full review of systems was performed, and negative with the exception of want is noted above in the HPI.        Objective        /80 (BP Location: Right arm, Patient Position: Sitting, BP Cuff Size: Large adult)   Pulse 66     Physical Exam  Vitals reviewed. Exam conducted with a chaperone present (MA - see notes.).   Constitutional:       Appearance: Normal appearance.   HENT:      Head: Normocephalic.      Mouth/Throat:      Mouth: Mucous membranes are moist.   Cardiovascular:      Rate and Rhythm: Normal rate.   Pulmonary:      Effort: Pulmonary effort is normal.   Abdominal:      Palpations: Abdomen is soft. There is no mass.      Tenderness: There is no abdominal tenderness.   Skin:     General: Skin is warm and  dry.   Neurological:      Mental Status: She is alert.   Psychiatric:         Mood and Affect: Mood normal.         Genitourinary:    Vulva: WNL   Bulbocavernosus reflex: Intact   Anal wink reflex: Intact   Perineal sensation: Decreased   Urethra: Atrophic   Vagina: Atrophic, well-healed suture lines, no mesh exposures visible or palpable   Atrophy: Mild   Cough stress test: Negative    Pelvic floor: prior   POP-Q: Postop Aa -3 / Ba -3 / TVL 7 / C -7.5 / D x / Ap -3 / Bp -3 / GH 3.5 / PB 2.5     Procedure Performed:       Diagnostic test and records review:      Post-void residual: 42 mL    Labs: n/a    Radiology: n/a    Procedural:     Urodynamics:  Filling phase: Normal capacity and compliance.  Stress incontinence not demonstrated although lower pressures mounted during testing.  Uninhibited detrusor contractions without leak.  Voiding phase: Complete emptying on initial uroflow and normal flow pattern, majority emptying on pressure flow with detrusor contraction mounted.  No significant signs of dyssynergia     Documentation reviewed: Prior EMR Records         Assessment & Plan     Roxann Houston is a 54 y.o. P3 with multiple sclerosis, recurrent symptomatic prolapse and stress predominant incontinence, now postop     She had vaginal vault prolapse, s/p sacrocolpopexy  Well-healed, excellent support, no mesh complications.  May return to all normal activities including intercourse if desired.  All questions answered    Stress incontinence  Voiding difficulty  Multiple sclerosis (HCC)  Significant improvement after sling without voiding difficulty.  Counseled on long-term observation of her multiple sclerosis and if she has any sudden worsening of her urinary symptoms, especially if this is difficulty emptying, hesitancy, double or triple voiding, she should reestablish care and potentially undergo repeat urodynamic testing to rule out neurogenic causes.    Genitourinary syndrome of menopause  Continue vaginal  estrogen, 1-2x week.                  Satnam Perera MD, FACOG  Urogynecology and Reconstructive Pelvic Surgery  Department of Obstetrics and Gynecology  [facility]        This medical record contains text that has been entered with the assistance of computer voice recognition and dictation software.  Therefore, it may contain unintended errors in text, spelling, punctuation, or grammar

## 2024-12-06 NOTE — PROGRESS NOTES
Patient here for Post Op Exam  Surgery Date: 10/23/24   PVR :  42 mL  Good #: [phone]   Pharmacy Verified

## (undated) DEVICE — APPLICATOR SURGIFLO - (6EA/BX)

## (undated) DEVICE — SLEEVE VASO DVT COMPRESSION CALF MED - (10PR/CA)

## (undated) DEVICE — SET LEADWIRE 5 LEAD BEDSIDE DISPOSABLE ECG (1SET OF 5/EA)

## (undated) DEVICE — PENCIL ELECTSURG 10FT BTN SWH - (50/CA)

## (undated) DEVICE — KIT SURGIFLO W/OUT THROMBIN - (6EA/BX)

## (undated) DEVICE — GLOVE SZ 6 BIOGEL PI MICRO - PF LF (50PR/BX 4BX/CA)

## (undated) DEVICE — FORCEPS MARYLAND BIPOLAR (14UN/EA)

## (undated) DEVICE — GLOVE SZ 6.5 BIOGEL PI MICRO - PF LF (50PR/BX)

## (undated) DEVICE — GLOVE BIOGEL PI INDICATOR SZ 8.0 SURGICAL PF LF -(50/BX 4BX/CA)

## (undated) DEVICE — LIGASURE LAPAROSCOPIC 5MM - (6EA/CA)

## (undated) DEVICE — BLADE SURGICAL #10 - (50/BX)

## (undated) DEVICE — DRIVER LARGE SUTURECUT NEEDLE (15UN/EA)

## (undated) DEVICE — SUTURE 2-0 PDS PLUS SH 27 (36EA/BX)"

## (undated) DEVICE — TUBE E-T HI-LO CUFF 7.0MM (10EA/PK)

## (undated) DEVICE — PAD OR TABLE DA VINCI 2IN X 20IN X 72IN - (12EA/CA)

## (undated) DEVICE — DRAPE ARM BOX OF 20

## (undated) DEVICE — DRAPE COLUMN BOX OF 20

## (undated) DEVICE — OBTURATOR BLADELESS STANDARD 8MM (6EA/BX)

## (undated) DEVICE — SCRUB SOLUTION EXIDINE 4% 40Z - 48/CS CHLORAHEXADINE GLUCONATE

## (undated) DEVICE — DERMABOND ADVANCED - (12EA/BX)

## (undated) DEVICE — WATER IRRIGATION STERILE 1000ML (12EA/CA)

## (undated) DEVICE — SUTURE 0 VICRYL PLUS CT-2 - 27 INCH (36/BX)

## (undated) DEVICE — SUTURE GENERAL

## (undated) DEVICE — PACK TRENGUARD 450 PROCEDURE (12EA/CA)

## (undated) DEVICE — SUCTION INSTRUMENT YANKAUER OPEN TIP W/O VENT (50EA/CA)

## (undated) DEVICE — SUCTION INSTRUMENT YANKAUER BULBOUS TIP W/O VENT (50EA/CA)

## (undated) DEVICE — GOWN WARMING STANDARD FLEX - (30/CA)

## (undated) DEVICE — JELLY SURGILUBE STERILE TUBE 4.25 OZ (1/EA)

## (undated) DEVICE — NEEDLE NON SAFETY HYPO 22 GA X 1 1/2 IN (100/BX)

## (undated) DEVICE — TUBE CONNECT SUCTION CLEAR 120 X 1/4" (50EA/CA)"

## (undated) DEVICE — SYSTEM CLEARIFY VISUALIZATION (10EA/PK)

## (undated) DEVICE — CANISTER SUCTION 3000ML MECHANICAL FILTER AUTO SHUTOFF MEDI-VAC NONSTERILE LF DISP (40EA/CA)

## (undated) DEVICE — SYRINGE 10 ML CONTROL LL (25EA/BX 4BX/CA)

## (undated) DEVICE — FORCEPS PROGRASP (18UN/EA)

## (undated) DEVICE — GLOVE CHEMO/AUTOPSY LARGE - POWDER FREE (50/BX)

## (undated) DEVICE — TIP SACROCOLPOPEXY

## (undated) DEVICE — SENSOR OXIMETER ADULT SPO2 RD SET (20EA/BX)

## (undated) DEVICE — COVER TIP ENDOWRIST HOT SHEAR - (10EA/BX) DA VINCI

## (undated) DEVICE — SET SUCTION/IRRIGATION WITH DISPOSABLE TIP (6/CA )PART #0250-070-520 IS A SUB

## (undated) DEVICE — ARMREST CRADLE FOAM - (2PR/PK 12PR/CA)

## (undated) DEVICE — GLOVE SZ 7.5 BIOGEL PI MICRO - PF LF (50PR/BX)

## (undated) DEVICE — GLOVE BIOGEL PI INDICATOR SZ 7.5 SURGICAL PF LF -(50/BX 4BX/CA)

## (undated) DEVICE — TUBING CLEARLINK DUO-VENT - C-FLO (48EA/CA)

## (undated) DEVICE — GLOVE BIOGEL PI INDICATOR SZ 7.0 SURGICAL PF LF - (50/BX 4BX/CA)

## (undated) DEVICE — GLOVE BIOGEL PI INDICATOR SZ 6.5 SURGICAL PF LF - (50/BX 4BX/CA)

## (undated) DEVICE — SUTURE GOR-TEX CV-2 TH-26 (2NO2A=12PK/BX) (2NO2B=36PK/BX)

## (undated) DEVICE — SUTURE 2-0 PROLENE SH (36PK/BX)

## (undated) DEVICE — SUTURE 4-0 MONOCRYL PLUS PS-2 - 27 INCH (36/BX)

## (undated) DEVICE — TRAY CATHETER FOLEY URINE METER W/STATLOCK 350ML (10EA/CA)

## (undated) DEVICE — TOWELS CLOTH SURGICAL - (4/PK 20PK/CA)

## (undated) DEVICE — SYRINGE ASEPTO - (50EA/CA

## (undated) DEVICE — SUTURE 2-0 MONOCRYL SH

## (undated) DEVICE — SUTURE 2-0 VICRYL PLUS SH - 27 INCH (36/BX)

## (undated) DEVICE — RINGDISP RETRACTOR LONESTAR

## (undated) DEVICE — GLOVE SZ 7 BIOGEL PI MICRO - PF LF (50PR/BX 4BX/CA)

## (undated) DEVICE — SPONGE XRAY 8X4 STERL. 12PL - (10EA/TY 80TY/CA)

## (undated) DEVICE — SET EXTENSION WITH 2 PORTS (48EA/CA) ***PART #2C8610 IS A SUBSTITUTE*****

## (undated) DEVICE — NEEDLE INSFL 120MM 14GA VRRS - (20/BX)

## (undated) DEVICE — MESH RESTORELLE Y 24 X 4 CM: Type: IMPLANTABLE DEVICE | Status: NON-FUNCTIONAL

## (undated) DEVICE — ELECTRODE DUAL RETURN W/ CORD - (50/PK)

## (undated) DEVICE — BRIEF STRETCH MATERNITY M/L - FITS 20-60IN (5EA/BG 20BG/CA)

## (undated) DEVICE — BAG RETRIEVAL 5MM (10EA/BX)

## (undated) DEVICE — TRAY FOLEY CATHETER STATLOCK 16FR SURESTEP (10EA/CA)

## (undated) DEVICE — OBTURATOR BLADELESS LONG 8MM (6EA/BX)

## (undated) DEVICE — PACK GYN DAVINCI (1EA/CA)

## (undated) DEVICE — SODIUM CHL IRRIGATION 0.9% 1000ML (12EA/CA)

## (undated) DEVICE — SLEEVE STERILE A599T - 30/BX 2BX/CS

## (undated) DEVICE — COVER LIGHT HANDLE ALC PLUS DISP (18EA/BX)

## (undated) DEVICE — SLEEVE, VASO, THIGH, MED

## (undated) DEVICE — Device

## (undated) DEVICE — SHEARS MONOPOLAR CURVED DA VINCI 10X'S REUSABLE

## (undated) DEVICE — DRIVER LARGE NEEDLE (15UN/EA)

## (undated) DEVICE — GOWN SURGEONS X-LARGE - DISP. (30/CA)